# Patient Record
Sex: MALE | Race: WHITE | NOT HISPANIC OR LATINO | Employment: UNEMPLOYED | URBAN - METROPOLITAN AREA
[De-identification: names, ages, dates, MRNs, and addresses within clinical notes are randomized per-mention and may not be internally consistent; named-entity substitution may affect disease eponyms.]

---

## 2019-12-03 ENCOUNTER — APPOINTMENT (OUTPATIENT)
Dept: RADIOLOGY | Facility: MEDICAL CENTER | Age: 24
End: 2019-12-03
Attending: EMERGENCY MEDICINE

## 2019-12-03 ENCOUNTER — HOSPITAL ENCOUNTER (EMERGENCY)
Facility: MEDICAL CENTER | Age: 24
End: 2019-12-03
Attending: EMERGENCY MEDICINE

## 2019-12-03 VITALS
SYSTOLIC BLOOD PRESSURE: 122 MMHG | BODY MASS INDEX: 31.34 KG/M2 | OXYGEN SATURATION: 96 % | HEIGHT: 66 IN | DIASTOLIC BLOOD PRESSURE: 74 MMHG | HEART RATE: 101 BPM | WEIGHT: 195 LBS | RESPIRATION RATE: 18 BRPM

## 2019-12-03 DIAGNOSIS — R56.9 SEIZURE (HCC): ICD-10-CM

## 2019-12-03 LAB
ALBUMIN SERPL BCP-MCNC: 4.7 G/DL (ref 3.2–4.9)
ALBUMIN/GLOB SERPL: 1.7 G/DL
ALP SERPL-CCNC: 71 U/L (ref 30–99)
ALT SERPL-CCNC: 43 U/L (ref 2–50)
ANION GAP SERPL CALC-SCNC: 14 MMOL/L (ref 0–11.9)
AST SERPL-CCNC: 35 U/L (ref 12–45)
BASOPHILS # BLD AUTO: 0.5 % (ref 0–1.8)
BASOPHILS # BLD: 0.03 K/UL (ref 0–0.12)
BILIRUB SERPL-MCNC: 0.5 MG/DL (ref 0.1–1.5)
BUN SERPL-MCNC: 10 MG/DL (ref 8–22)
CALCIUM SERPL-MCNC: 9.7 MG/DL (ref 8.5–10.5)
CHLORIDE SERPL-SCNC: 105 MMOL/L (ref 96–112)
CO2 SERPL-SCNC: 22 MMOL/L (ref 20–33)
CREAT SERPL-MCNC: 0.95 MG/DL (ref 0.5–1.4)
EOSINOPHIL # BLD AUTO: 0.02 K/UL (ref 0–0.51)
EOSINOPHIL NFR BLD: 0.3 % (ref 0–6.9)
ERYTHROCYTE [DISTWIDTH] IN BLOOD BY AUTOMATED COUNT: 43.5 FL (ref 35.9–50)
GLOBULIN SER CALC-MCNC: 2.8 G/DL (ref 1.9–3.5)
GLUCOSE SERPL-MCNC: 98 MG/DL (ref 65–99)
HCT VFR BLD AUTO: 48.4 % (ref 42–52)
HGB BLD-MCNC: 16.5 G/DL (ref 14–18)
IMM GRANULOCYTES # BLD AUTO: 0.02 K/UL (ref 0–0.11)
IMM GRANULOCYTES NFR BLD AUTO: 0.3 % (ref 0–0.9)
LACTATE BLD-SCNC: 3.1 MMOL/L (ref 0.5–2)
LYMPHOCYTES # BLD AUTO: 1.7 K/UL (ref 1–4.8)
LYMPHOCYTES NFR BLD: 26.9 % (ref 22–41)
MCH RBC QN AUTO: 30.7 PG (ref 27–33)
MCHC RBC AUTO-ENTMCNC: 34.1 G/DL (ref 33.7–35.3)
MCV RBC AUTO: 90.1 FL (ref 81.4–97.8)
MONOCYTES # BLD AUTO: 0.59 K/UL (ref 0–0.85)
MONOCYTES NFR BLD AUTO: 9.3 % (ref 0–13.4)
NEUTROPHILS # BLD AUTO: 3.96 K/UL (ref 1.82–7.42)
NEUTROPHILS NFR BLD: 62.7 % (ref 44–72)
NRBC # BLD AUTO: 0 K/UL
NRBC BLD-RTO: 0 /100 WBC
PLATELET # BLD AUTO: 266 K/UL (ref 164–446)
PMV BLD AUTO: 9.9 FL (ref 9–12.9)
POTASSIUM SERPL-SCNC: 4.2 MMOL/L (ref 3.6–5.5)
PROT SERPL-MCNC: 7.5 G/DL (ref 6–8.2)
RBC # BLD AUTO: 5.37 M/UL (ref 4.7–6.1)
SODIUM SERPL-SCNC: 141 MMOL/L (ref 135–145)
WBC # BLD AUTO: 6.3 K/UL (ref 4.8–10.8)

## 2019-12-03 PROCEDURE — 80053 COMPREHEN METABOLIC PANEL: CPT

## 2019-12-03 PROCEDURE — 96376 TX/PRO/DX INJ SAME DRUG ADON: CPT

## 2019-12-03 PROCEDURE — 700105 HCHG RX REV CODE 258: Performed by: EMERGENCY MEDICINE

## 2019-12-03 PROCEDURE — 96375 TX/PRO/DX INJ NEW DRUG ADDON: CPT

## 2019-12-03 PROCEDURE — 96374 THER/PROPH/DIAG INJ IV PUSH: CPT

## 2019-12-03 PROCEDURE — 83605 ASSAY OF LACTIC ACID: CPT

## 2019-12-03 PROCEDURE — 70450 CT HEAD/BRAIN W/O DYE: CPT

## 2019-12-03 PROCEDURE — 700102 HCHG RX REV CODE 250 W/ 637 OVERRIDE(OP): Performed by: EMERGENCY MEDICINE

## 2019-12-03 PROCEDURE — 99285 EMERGENCY DEPT VISIT HI MDM: CPT

## 2019-12-03 PROCEDURE — 85025 COMPLETE CBC W/AUTO DIFF WBC: CPT

## 2019-12-03 PROCEDURE — 700111 HCHG RX REV CODE 636 W/ 250 OVERRIDE (IP): Performed by: EMERGENCY MEDICINE

## 2019-12-03 PROCEDURE — A9270 NON-COVERED ITEM OR SERVICE: HCPCS | Performed by: EMERGENCY MEDICINE

## 2019-12-03 RX ORDER — LAMOTRIGINE 100 MG/1
25 TABLET ORAL DAILY
Status: COMPLETED | OUTPATIENT
Start: 2019-12-03 | End: 2019-12-03

## 2019-12-03 RX ORDER — SODIUM CHLORIDE 9 MG/ML
1000 INJECTION, SOLUTION INTRAVENOUS ONCE
Status: COMPLETED | OUTPATIENT
Start: 2019-12-03 | End: 2019-12-03

## 2019-12-03 RX ORDER — MORPHINE SULFATE 4 MG/ML
4 INJECTION, SOLUTION INTRAMUSCULAR; INTRAVENOUS ONCE
Status: COMPLETED | OUTPATIENT
Start: 2019-12-03 | End: 2019-12-03

## 2019-12-03 RX ORDER — LAMOTRIGINE 25 MG/1
25 TABLET ORAL 2 TIMES DAILY
Qty: 60 TAB | Refills: 0 | Status: SHIPPED | OUTPATIENT
Start: 2019-12-03 | End: 2020-01-02

## 2019-12-03 RX ORDER — ONDANSETRON 2 MG/ML
4 INJECTION INTRAMUSCULAR; INTRAVENOUS ONCE
Status: COMPLETED | OUTPATIENT
Start: 2019-12-03 | End: 2019-12-03

## 2019-12-03 RX ORDER — LAMOTRIGINE 25 MG/1
25 TABLET ORAL DAILY
COMMUNITY
End: 2019-12-03

## 2019-12-03 RX ADMIN — LAMOTRIGINE 25 MG: 100 TABLET ORAL at 14:35

## 2019-12-03 RX ADMIN — SODIUM CHLORIDE 1000 ML: 9 INJECTION, SOLUTION INTRAVENOUS at 13:03

## 2019-12-03 RX ADMIN — ONDANSETRON 4 MG: 2 INJECTION INTRAMUSCULAR; INTRAVENOUS at 14:35

## 2019-12-03 RX ADMIN — ONDANSETRON 4 MG: 2 INJECTION INTRAMUSCULAR; INTRAVENOUS at 13:01

## 2019-12-03 RX ADMIN — MORPHINE SULFATE 4 MG: 4 INJECTION INTRAVENOUS at 13:01

## 2019-12-03 SDOH — HEALTH STABILITY: MENTAL HEALTH: HOW OFTEN DO YOU HAVE A DRINK CONTAINING ALCOHOL?: 2-4 TIMES A MONTH

## 2019-12-03 NOTE — ED NOTES
Patient resting with family at bedside. Case management assessing pt needs. No other concerns at this time.

## 2019-12-03 NOTE — ED PROVIDER NOTES
ED Provider Note    CHIEF COMPLAINT  Chief Complaint   Patient presents with   • Seizure       HPI  Abelino Crocker is a 24 y.o. male with a history of a seizure disorder, asthma who presents after having multiple seizures today.  The patient was diagnosed with seizures several years ago, and last saw a neurologist at [facility] about 2-1/2 years ago.  The patient was placed on Lamictal 25 mg twice a day at that time and has had no further seizures.  The patient ran out of his medications about 3 months ago and is not take any since due to the cost.  Today the patient was at home, said he had an aura, then does not remember much after that.  Family notes that he had a tonic-clonic seizure, and did not appear to be waking up.  He then had a second seizure, and EMS was called.  The patient had 2 further seizures in transport, and was given a total of 5 mg of Versed.  On arrival, the patient is somewhat drowsy, but is awake and able to answer questions appropriately.  He has been sleeping well, denies any recent illness including fever, chills, sore throat, cough, vomiting, or diarrhea.  He does complain of having a headache and some body aches and pains after the seizure today.      REVIEW OF SYSTEMS  See HPI for further details. All other systems are negative.     PAST MEDICAL HISTORY  Past Medical History:   Diagnosis Date   • ASTHMA    • Seizure disorder (HCC)        FAMILY HISTORY  History reviewed. No pertinent family history.    SOCIAL HISTORY  Social History     Socioeconomic History   • Marital status: Single     Spouse name: Not on file   • Number of children: Not on file   • Years of education: Not on file   • Highest education level: Not on file   Occupational History   • Not on file   Social Needs   • Financial resource strain: Not on file   • Food insecurity:     Worry: Not on file     Inability: Not on file   • Transportation needs:     Medical: Not on file     Non-medical: Not on file  "  Tobacco Use   • Smoking status: Never Smoker   • Smokeless tobacco: Never Used   Substance and Sexual Activity   • Alcohol use: Yes     Frequency: 2-4 times a month   • Drug use: Yes     Comment: thc   • Sexual activity: Not on file   Lifestyle   • Physical activity:     Days per week: Not on file     Minutes per session: Not on file   • Stress: Not on file   Relationships   • Social connections:     Talks on phone: Not on file     Gets together: Not on file     Attends Anabaptism service: Not on file     Active member of club or organization: Not on file     Attends meetings of clubs or organizations: Not on file     Relationship status: Not on file   • Intimate partner violence:     Fear of current or ex partner: Not on file     Emotionally abused: Not on file     Physically abused: Not on file     Forced sexual activity: Not on file   Other Topics Concern   • Not on file   Social History Narrative    ** Merged History Encounter **            SURGICAL HISTORY  History reviewed. No pertinent surgical history.    CURRENT MEDICATIONS  Home Medications     Reviewed by Lo Boyd (Pharmacy Tech) on 12/03/19 at 1336  Med List Status: Complete   Patient Get Taking any Medications                 ALLERGIES  No Known Allergies    PHYSICAL EXAM  VITAL SIGNS: Blood Pressure 108/57   Pulse 76   Respiration 18   Height 1.676 m (5' 6\")   Weight 88.5 kg (195 lb)   Oxygen Saturation 96%   Body Mass Index 31.47 kg/m²   Constitutional: Awake, alert, in no acute distress, Non-toxic appearance.   HENT: Atraumatic. Bilateral external ears normal, mucous membranes moist, throat nonerythematous without exudates, no oral trauma, nose is normal.  Eyes: PERRL, EOMI, conjunctiva moist, noninjected.  Neck: Nontender, Normal range of motion, No nuchal rigidity, No stridor.   Lymphatic: No lymphadenopathy noted.   Cardiovascular: Regular rate and rhythm, no murmurs, rubs, gallops.  Thorax & Lungs:  Good breath sounds " bilaterally, no wheezes, rales, or retractions.  No chest tenderness.  Abdomen: Bowel sounds normal, Soft, nontender, nondistended, no rebound, guarding, masses.  Back: No CVA or spinal tenderness.  Extremities: Intact distal pulses, No edema, No tenderness.   Skin: Warm, Dry, No rashes.   Musculoskeletal: No joint swelling or tenderness.  Neurologic: Alert & oriented x 3, sensory and motor function normal. No focal deficits.   Psychiatric: Affect normal, Judgment normal, Mood normal.       RADIOLOGY/PROCEDURES  CT-HEAD W/O   Final Result         1. No acute intracranial abnormality. No evidence of acute intracranial hemorrhage or mass lesion.                   COURSE & MEDICAL DECISION MAKING  Pertinent Labs & Imaging studies reviewed. (See chart for details)  The patient presents after having multiple seizures today.  He has been off his medications for 3 months.  He did receive 5 mg of Versed prior to arrival.  Clinically appears dehydrated with dry mucous membranes.  He has had very little to eat or drink today.  IV was placed, he was given a bolus of normal saline, morphine, and Zofran.    HYDRATION: Based on the patient's presentation of Dehydration and Inability to take oral fluids the patient was given IV fluids. IV Hydration was used because oral hydration was not adequate alone. Upon recheck following hydration, the patient was feeling improved.     CBC is normal with white count 6300, normal differential, chemistry shows a CO2 22, anion gap 14, otherwise unremarkable.  Lactic acid elevated 3.1.  CT scan of head without contrast was negative for any acute intracranial findings.  On recheck, the patient was feeling improved.  He was given Lamictal 25 mg orally.  I had  see the patient to see if they could assist with his medications.  They did give him a discount card and said that the patient felt he could afford the medications.  The patient will be placed on his Lamictal 25 mg twice a day.   He is referred to Dr. Irwin of neurology for follow-up.  I also asked him to follow-up with his PCP.  He is told not to drive or operate any vehicles.  He is to keep his feet on the ground, avoid working at heights.  He is to return to the ER for any recurrent seizures, fever, vomiting, or any other problems.          FINAL IMPRESSION  1.  Seizure disorder  2.   3.         Electronically signed by: Raheel Sanchez, 12/3/2019 3:32 PM

## 2019-12-03 NOTE — DISCHARGE PLANNING
Asked to assist with obtaining medications for the patient.  Patient currently has no insurance and will not get any at his job for another two months or so.  Look at [employer] for coupon but found that medication is only $9.00 at [facility] without insurance or the coupon.  Called [facility] to ensure price and was told that $9.00 cost was fact.  Did give [employer] paperwork to family so they could show [facility] if issues arise.  Family appreciative.    Advised patient to get an appointment with his PCP as he needed to be followed closely and that the ER is really not appropriate for medication refills.  Patient stated understanding and will find out who his insurance has for PCP's and make an appointment for shortly after when his insurance kicks in.    Also suggested that patient call or contact [employer] for temporary insurance assistance.  His mother, who is at bedside, is going immediately up to see [employer].

## 2019-12-03 NOTE — ED NOTES
Med rec complete per pt- denies taking medications. Stopped taking lamictal approx 3 months ago. NKDA.

## 2019-12-03 NOTE — ED TRIAGE NOTES
Patient BIB EMS from home for seizure. X4. Witnessed by EMS. Did not return to consciousness between seizure 3 and 4. 5mg versed Im given. Pt currently A&Ox3. Delayed responses during conversation, but appropriate.     Stopped taking lamictal d/t price.     Also has injury to R knuckles but states it was from last night when he punched a picture frame.

## 2021-05-12 ENCOUNTER — OFFICE VISIT (OUTPATIENT)
Dept: MEDICAL GROUP | Facility: MEDICAL CENTER | Age: 26
End: 2021-05-12
Attending: INTERNAL MEDICINE
Payer: MEDICAID

## 2021-05-12 VITALS
WEIGHT: 186 LBS | OXYGEN SATURATION: 97 % | HEIGHT: 67 IN | DIASTOLIC BLOOD PRESSURE: 80 MMHG | TEMPERATURE: 98.1 F | SYSTOLIC BLOOD PRESSURE: 122 MMHG | BODY MASS INDEX: 29.19 KG/M2 | RESPIRATION RATE: 16 BRPM | HEART RATE: 82 BPM

## 2021-05-12 DIAGNOSIS — G40.909 SEIZURE DISORDER (HCC): ICD-10-CM

## 2021-05-12 DIAGNOSIS — F41.1 GENERALIZED ANXIETY DISORDER WITH PANIC ATTACKS: ICD-10-CM

## 2021-05-12 DIAGNOSIS — J45.20 MILD INTERMITTENT ASTHMA WITHOUT COMPLICATION: ICD-10-CM

## 2021-05-12 DIAGNOSIS — F41.0 GENERALIZED ANXIETY DISORDER WITH PANIC ATTACKS: ICD-10-CM

## 2021-05-12 DIAGNOSIS — K92.0 HEMATEMESIS WITH NAUSEA: ICD-10-CM

## 2021-05-12 PROBLEM — F12.90 MARIJUANA USE: Status: ACTIVE | Noted: 2021-05-12

## 2021-05-12 PROBLEM — J45.40 MODERATE PERSISTENT ASTHMA WITHOUT COMPLICATION: Status: ACTIVE | Noted: 2021-05-12

## 2021-05-12 PROCEDURE — 99204 OFFICE O/P NEW MOD 45 MIN: CPT | Performed by: INTERNAL MEDICINE

## 2021-05-12 PROCEDURE — 99213 OFFICE O/P EST LOW 20 MIN: CPT | Performed by: INTERNAL MEDICINE

## 2021-05-12 RX ORDER — SUCRALFATE 1 G/1
1 TABLET ORAL
Qty: 90 TABLET | Refills: 3 | Status: SHIPPED | OUTPATIENT
Start: 2021-05-12 | End: 2021-08-30

## 2021-05-12 RX ORDER — LAMOTRIGINE 25 MG/1
25 TABLET ORAL DAILY
Qty: 30 TABLET | Refills: 3 | Status: SHIPPED | OUTPATIENT
Start: 2021-05-12 | End: 2021-08-04 | Stop reason: SDUPTHER

## 2021-05-12 RX ORDER — ALBUTEROL SULFATE 90 UG/1
2 AEROSOL, METERED RESPIRATORY (INHALATION) EVERY 6 HOURS PRN
Qty: 8.5 G | Refills: 5 | Status: SHIPPED | OUTPATIENT
Start: 2021-05-12 | End: 2021-05-14 | Stop reason: SDUPTHER

## 2021-05-12 RX ORDER — OMEPRAZOLE 20 MG/1
20 CAPSULE, DELAYED RELEASE ORAL 2 TIMES DAILY
Qty: 60 CAPSULE | Refills: 3 | Status: SHIPPED | OUTPATIENT
Start: 2021-05-12

## 2021-05-12 RX ORDER — HYDROXYZINE HYDROCHLORIDE 25 MG/1
25 TABLET, FILM COATED ORAL 3 TIMES DAILY PRN
Qty: 30 TABLET | Refills: 2 | Status: SHIPPED | OUTPATIENT
Start: 2021-05-12

## 2021-05-12 RX ORDER — ESCITALOPRAM OXALATE 10 MG/1
TABLET ORAL
Qty: 30 TABLET | Refills: 1 | Status: SHIPPED | OUTPATIENT
Start: 2021-05-12 | End: 2021-07-13 | Stop reason: SDUPTHER

## 2021-05-12 ASSESSMENT — FIBROSIS 4 INDEX: FIB4 SCORE: 0.46

## 2021-05-12 ASSESSMENT — PATIENT HEALTH QUESTIONNAIRE - PHQ9: CLINICAL INTERPRETATION OF PHQ2 SCORE: 0

## 2021-05-12 NOTE — ASSESSMENT & PLAN NOTE
Patient reports a long history of seizures.  States he was first diagnosed at age 17 and he did have a neurologist but has not been seen by one for about 4 years due to not having insurance.  States that when he was initially diagnosed, seizures were quite frequent and he was having 1 every month.  His most recent brain imaging was a CT head obtained December 2019 and was normal.  States that they were never able to figure out the cause of his seizures but they have affected him severely.  He has been off of medication for several years because he has not had a PCP or neurologist.  Last seizure was about a month ago.  He does not drive.  He has noticed significant memory difficulties which he relates to his frequent seizures.  In the past, he has been on numerous medications.  He states the Lamictal worked the best but he still had seizures while taking it.  Recalls being on several other medications and on chart review it looks like he was taking Dilantin, Depakote, and Keppra.  He says he had significant side effects with the combination of these 3 meds and Lamictal with a lot of drowsiness and difficulty functioning.

## 2021-05-12 NOTE — PROGRESS NOTES
Abelino Crocker is a 25 y.o. male here for anxiety, seizures, stomach pain, est care  HPI:  No previous PCP  Seizure disorder (HCC)  Patient reports a long history of seizures.  States he was first diagnosed at age 17 and he did have a neurologist but has not been seen by one for about 4 years due to not having insurance.  States that when he was initially diagnosed, seizures were quite frequent and he was having 1 every month.  His most recent brain imaging was a CT head obtained December 2019 and was normal.  States that they were never able to figure out the cause of his seizures but they have affected him severely.  He has been off of medication for several years because he has not had a PCP or neurologist.  Last seizure was about a month ago.  He does not drive.  He has noticed significant memory difficulties which he relates to his frequent seizures.  In the past, he has been on numerous medications.  He states the Lamictal worked the best but he still had seizures while taking it.  Recalls being on several other medications and on chart review it looks like he was taking Dilantin, Depakote, and Keppra.  He says he had significant side effects with the combination of these 3 meds and Lamictal with a lot of drowsiness and difficulty functioning.      Hematemesis with nausea  RegularAbout a month ago, started to have nausea with vomiting.  Reports always having some nausea which he has related to his anxiety.  He is having pain in the upper abdomen and a burning feeling along the sides of the abdomen.  He states that about 3 weeks ago he had a large episode of hematemesis and he has had small amounts of blood in his vomit ever since.  He started taking Prilosec intermittently and thinks this has helped a little bit.  He is also changed his diet by stopping spicy foods.  He reports some episodes of melena.  Denies out of country travel.  Has not sought medical attention even after the large episode of  hematemesis.  No history of alcoholism or cirrhosis although he does sometimes have 5 or 6 beers at a time.  No excessive NSAID use.    Mild intermittent asthma without complication  Has a history of asthma since childhood.  Has been using his mother's albuterol inhaler 1-2 times a day.  Reports it has been more frequent lately since he gets some chest tightness after his nausea and vomiting but he typically does not need to use it daily.  He denies nocturnal symptoms, cough.      Generalized anxiety disorder with panic attacks  He reports a long history of anxiety.  States that he has never been treated for it before outside 8 weeks of therapy that he did through the court system.  States that this did not really help.  Is having panic attacks at least 1 time per week which are very severe and is feeling anxious every day with milder attacks associated with nausea.  Has never been on any medication for anxiety or depression and does not know of any family members who have been treated.     Current medicines (including changes today)  Current Outpatient Medications   Medication Sig Dispense Refill   • Multiple Vitamin (MULTIVITAMIN PO) Take  by mouth.     • lamoTRIgine (LAMICTAL) 25 MG Tab Take 1 tablet by mouth every day. 30 tablet 3   • omeprazole (PRILOSEC) 20 MG delayed-release capsule Take 1 capsule by mouth 2 times a day. 60 capsule 3   • sucralfate (CARAFATE) 1 GM Tab Take 1 tablet by mouth 4 Times a Day,Before Meals and at Bedtime. 90 tablet 3   • albuterol 108 (90 Base) MCG/ACT Aero Soln inhalation aerosol Inhale 2 Puffs every 6 hours as needed for Shortness of Breath. 8.5 g 5   • hydrOXYzine HCl (ATARAX) 25 MG Tab Take 1 tablet by mouth 3 times a day as needed for Anxiety. 30 tablet 2   • escitalopram (LEXAPRO) 10 MG Tab Take 1/2 tab for 1 week then increase to 1 full tab 30 tablet 1     No current facility-administered medications for this visit.     He  has a past medical history of ASTHMA and Seizure  disorder (HCC).  He  has no past surgical history on file.  Social History     Tobacco Use   • Smoking status: Never Smoker   • Smokeless tobacco: Never Used   Vaping Use   • Vaping Use: Never used   Substance Use Topics   • Alcohol use: Yes     Comment: 5-6 beers twice a month   • Drug use: Yes     Frequency: 7.0 times per week     Types: Marijuana, Inhaled     Comment: tinctures, edibles     Social History     Social History Narrative    ** Merged History Encounter **          Family History   Problem Relation Age of Onset   • Asthma Mother    • Diabetes Maternal Grandfather    • Hypertension Maternal Grandfather    • Hyperlipidemia Maternal Grandfather    • Cancer Paternal Grandmother    • Heart Disease Neg Hx    • Stroke Neg Hx          ROS  As above in HPI  All other systems reviewed and are negative     Objective:     Vitals:    05/12/21 1344   BP: 122/80   Pulse: 82   Resp: 16   Temp: 36.7 °C (98.1 °F)   SpO2: 97%     Body mass index is 29.57 kg/m².  Physical Exam:    Constitutional: Alert, no distress.  Skin: Warm, dry, good turgor, no rashes in visible areas.  Eye: Equal, round and reactive, conjunctiva clear, lids normal.  ENMT: Lips without lesions, good dentition, oropharynx clear, TM's clear bilaterally.  Neck: Trachea midline, no masses, no thyromegaly. No cervical or supraclavicular lymphadenopathy.  Respiratory: Unlabored respiratory effort, lungs clear to auscultation, no wheezes, no ronchi.  Cardiovascular: Regular rate and rhythm, no murmurs appreciated, no lower extremity edema.  Abdomen: Soft, moderate tenderness to palpation in epigastric region without rebound or guarding, mild tenderness over RUQ and LUQ, non tender over LLQ and RLQ, no masses, no hepatosplenomegaly.  Psych: Alert and oriented x3, tearful throughout interview        Assessment and Plan:   The following treatment plan was discussed    1. Seizure disorder (HCC)  Uncontrolled.  We will restart low-dose of Lamictal but this  likely needs to be uptitrated and he would benefit from being under the care of neurology for management.  Letter written stating that he is unable to drive at this time for his work.  - REFERRAL TO NEUROLOGY  - lamoTRIgine (LAMICTAL) 25 MG Tab; Take 1 tablet by mouth every day.  Dispense: 30 tablet; Refill: 3  - Comp Metabolic Panel; Future    2. Hematemesis with nausea  Concern for peptic ulcer disease versus gastritis.  Given his significant hematemesis recently with persistent more mild hematemesis and melena, have placed urgent referral to GI as I do believe he would benefit from an upper endoscopy to evaluate bleeding source.  We will have him start twice daily Prilosec and Carafate before meals and obtain labs to make sure he is not anemic or iron deficient.  ER precautions given for increased bleeding and pain  - REFERRAL TO GASTROENTEROLOGY  - omeprazole (PRILOSEC) 20 MG delayed-release capsule; Take 1 capsule by mouth 2 times a day.  Dispense: 60 capsule; Refill: 3  - sucralfate (CARAFATE) 1 GM Tab; Take 1 tablet by mouth 4 Times a Day,Before Meals and at Bedtime.  Dispense: 90 tablet; Refill: 3  - Comp Metabolic Panel; Future  - CBC WITH DIFFERENTIAL; Future  - FERRITIN; Future  - IRON/TOTAL IRON BIND; Future    3. Mild intermittent asthma without complication  - albuterol 108 (90 Base) MCG/ACT Aero Soln inhalation aerosol; Inhale 2 Puffs every 6 hours as needed for Shortness of Breath.  Dispense: 8.5 g; Refill: 5    4. Generalized anxiety disorder with panic attacks  Uncontrolled.  We will start on Lexapro and hydroxyzine as needed.  Declines referral to therapy.  We will follow-up in 5 weeks.  - hydrOXYzine HCl (ATARAX) 25 MG Tab; Take 1 tablet by mouth 3 times a day as needed for Anxiety.  Dispense: 30 tablet; Refill: 2  - escitalopram (LEXAPRO) 10 MG Tab; Take 1/2 tab for 1 week then increase to 1 full tab  Dispense: 30 tablet; Refill: 1        Followup: Return in about 5 weeks (around 6/16/2021), or  if symptoms worsen or fail to improve, for anxiety.

## 2021-05-12 NOTE — ASSESSMENT & PLAN NOTE
He reports a long history of anxiety.  States that he has never been treated for it before outside 8 weeks of therapy that he did through the court system.  States that this did not really help.  Is having panic attacks at least 1 time per week which are very severe and is feeling anxious every day with milder attacks associated with nausea.  Has never been on any medication for anxiety or depression and does not know of any family members who have been treated.

## 2021-05-12 NOTE — ASSESSMENT & PLAN NOTE
Has a history of asthma since childhood.  Has been using his mother's albuterol inhaler 1-2 times a day.  Reports it has been more frequent lately since he gets some chest tightness after his nausea and vomiting but he typically does not need to use it daily.  He denies nocturnal symptoms, cough.

## 2021-05-12 NOTE — LETTER
May 12, 2021      To whom it may concern:    Abelino Crocker is currently a patient under my care at the [facility].  He suffers from seizures which are not well controlled currently.  Because of this, he cannot safely drive.  He has been referred to neurology and restarted on medication.  Neurology will clear him when he is safe to drive.    If you have any questions or concerns, please don't hesitate to call.        Sincerely,        Mariah Baxter M.D.    Electronically Signed

## 2021-05-14 RX ORDER — ALBUTEROL SULFATE 90 UG/1
2 AEROSOL, METERED RESPIRATORY (INHALATION) EVERY 6 HOURS PRN
Qty: 8.5 G | Refills: 5 | Status: SHIPPED | OUTPATIENT
Start: 2021-05-14

## 2021-06-29 ENCOUNTER — TELEPHONE (OUTPATIENT)
Dept: MEDICAL GROUP | Facility: MEDICAL CENTER | Age: 26
End: 2021-06-29

## 2021-07-13 DIAGNOSIS — F41.0 GENERALIZED ANXIETY DISORDER WITH PANIC ATTACKS: ICD-10-CM

## 2021-07-13 DIAGNOSIS — F41.1 GENERALIZED ANXIETY DISORDER WITH PANIC ATTACKS: ICD-10-CM

## 2021-07-14 RX ORDER — ESCITALOPRAM OXALATE 10 MG/1
10 TABLET ORAL DAILY
Qty: 30 TABLET | Refills: 5 | Status: SHIPPED | OUTPATIENT
Start: 2021-07-14

## 2021-08-04 ENCOUNTER — TELEPHONE (OUTPATIENT)
Dept: MEDICAL GROUP | Facility: MEDICAL CENTER | Age: 26
End: 2021-08-04

## 2021-08-04 DIAGNOSIS — G40.909 SEIZURE DISORDER (HCC): ICD-10-CM

## 2021-08-04 RX ORDER — LAMOTRIGINE 25 MG/1
25 TABLET ORAL 2 TIMES DAILY
Qty: 60 TABLET | Refills: 3 | Status: SHIPPED | OUTPATIENT
Start: 2021-08-04

## 2021-08-05 NOTE — TELEPHONE ENCOUNTER
Received request via: Pharmacy    Was the patient seen in the last year in this department? Yes    Does the patient have an active prescription (recently filled or refills available) for medication(s) requested? No         Spoke with Patients mother, patient was reported to have had 3 seizures in one day, was taken to Er, Er provider advised to have Pt take lamotrigine @25mg bid. Patient is out of medication and pharmacy is indicating that the current Rx is showing too early to fill. Needs new rx   Checked with insurance, shows covered     Rx to show change in dosage

## 2021-08-09 ENCOUNTER — TELEPHONE (OUTPATIENT)
Dept: MEDICAL GROUP | Facility: MEDICAL CENTER | Age: 26
End: 2021-08-09

## 2021-08-09 NOTE — TELEPHONE ENCOUNTER
Phone Number Called: [phone] (home)     Call outcome: Spoke to pharmacy re: pt.'s rx    Message: Pharmacy faxed a prescription refill request for a discontinued rx for escitalopram. Called and updated pharmacy with new rx.

## 2021-08-24 DIAGNOSIS — K92.0 HEMATEMESIS WITH NAUSEA: ICD-10-CM

## 2021-08-24 NOTE — TELEPHONE ENCOUNTER
Received request via: Pharmacy    Was the patient seen in the last year in this department? Yes    Does the patient have an active prescription (recently filled or refills available) for medication(s) requested? No   No apt scheduled at this time

## 2021-08-30 RX ORDER — SUCRALFATE 1 G/1
TABLET ORAL
Qty: 120 TABLET | Refills: 3 | Status: SHIPPED | OUTPATIENT
Start: 2021-08-30

## 2022-12-28 DIAGNOSIS — F41.0 GENERALIZED ANXIETY DISORDER WITH PANIC ATTACKS: ICD-10-CM

## 2022-12-28 DIAGNOSIS — G40.909 SEIZURE DISORDER (HCC): ICD-10-CM

## 2022-12-28 DIAGNOSIS — F41.1 GENERALIZED ANXIETY DISORDER WITH PANIC ATTACKS: ICD-10-CM

## 2022-12-28 RX ORDER — HYDROXYZINE HYDROCHLORIDE 25 MG/1
25 TABLET, FILM COATED ORAL 3 TIMES DAILY PRN
Qty: 30 TABLET | Refills: 2 | OUTPATIENT
Start: 2022-12-28

## 2022-12-28 RX ORDER — LAMOTRIGINE 25 MG/1
25 TABLET ORAL 2 TIMES DAILY
Qty: 60 TABLET | Refills: 3 | OUTPATIENT
Start: 2022-12-28

## 2022-12-28 NOTE — TELEPHONE ENCOUNTER
Received request via: Pharmacy    Was the patient seen in the last year in this department? Yes    Does the patient have an active prescription (recently filled or refills available) for medication(s) requested? No    Does the patient have retirement Plus and need 100 day supply (blood pressure, diabetes and cholesterol meds only)? Patient does not have SCP

## 2023-05-17 NOTE — ASSESSMENT & PLAN NOTE
RegularAbout a month ago, started to have nausea with vomiting.  Reports always having some nausea which he has related to his anxiety.  He is having pain in the upper abdomen and a burning feeling along the sides of the abdomen.  He states that about 3 weeks ago he had a large episode of hematemesis and he has had small amounts of blood in his vomit ever since.  He started taking Prilosec intermittently and thinks this has helped a little bit.  He is also changed his diet by stopping spicy foods.  He reports some episodes of melena.  Denies out of country travel.  Has not sought medical attention even after the large episode of hematemesis.  No history of alcoholism or cirrhosis although he does sometimes have 5 or 6 beers at a time.  No excessive NSAID use.   Xenograft Text: The defect edges were debeveled with a #15 scalpel blade.  Given the location of the defect, shape of the defect and the proximity to free margins a xenograft was deemed most appropriate.  The graft was then trimmed to fit the size of the defect.  The graft was then placed in the primary defect and oriented appropriately.

## 2024-07-31 ENCOUNTER — OFFICE VISIT (OUTPATIENT)
Dept: MEDICAL GROUP | Facility: MEDICAL CENTER | Age: 29
End: 2024-07-31
Attending: FAMILY MEDICINE
Payer: COMMERCIAL

## 2024-07-31 VITALS
OXYGEN SATURATION: 98 % | BODY MASS INDEX: 30.68 KG/M2 | DIASTOLIC BLOOD PRESSURE: 76 MMHG | SYSTOLIC BLOOD PRESSURE: 118 MMHG | TEMPERATURE: 97.2 F | WEIGHT: 193 LBS | RESPIRATION RATE: 16 BRPM | HEART RATE: 88 BPM

## 2024-07-31 DIAGNOSIS — M25.561 ACUTE PAIN OF BOTH KNEES: ICD-10-CM

## 2024-07-31 DIAGNOSIS — G40.909 SEIZURE DISORDER (HCC): ICD-10-CM

## 2024-07-31 DIAGNOSIS — F41.1 GENERALIZED ANXIETY DISORDER WITH PANIC ATTACKS: ICD-10-CM

## 2024-07-31 DIAGNOSIS — F43.21 GRIEF: ICD-10-CM

## 2024-07-31 DIAGNOSIS — Z13.21 SCREENING FOR ENDOCRINE, NUTRITIONAL, METABOLIC AND IMMUNITY DISORDER: ICD-10-CM

## 2024-07-31 DIAGNOSIS — F41.0 GENERALIZED ANXIETY DISORDER WITH PANIC ATTACKS: ICD-10-CM

## 2024-07-31 DIAGNOSIS — Z11.59 NEED FOR HEPATITIS C SCREENING TEST: ICD-10-CM

## 2024-07-31 DIAGNOSIS — Z13.0 SCREENING FOR ENDOCRINE, NUTRITIONAL, METABOLIC AND IMMUNITY DISORDER: ICD-10-CM

## 2024-07-31 DIAGNOSIS — R29.898 LEG WEAKNESS, BILATERAL: ICD-10-CM

## 2024-07-31 DIAGNOSIS — Z13.228 SCREENING FOR ENDOCRINE, NUTRITIONAL, METABOLIC AND IMMUNITY DISORDER: ICD-10-CM

## 2024-07-31 DIAGNOSIS — M25.562 ACUTE PAIN OF BOTH KNEES: ICD-10-CM

## 2024-07-31 DIAGNOSIS — Z13.29 SCREENING FOR ENDOCRINE, NUTRITIONAL, METABOLIC AND IMMUNITY DISORDER: ICD-10-CM

## 2024-07-31 DIAGNOSIS — Z11.4 SCREENING FOR HIV WITHOUT PRESENCE OF RISK FACTORS: ICD-10-CM

## 2024-07-31 RX ORDER — CYCLOBENZAPRINE HCL 10 MG
10 TABLET ORAL 3 TIMES DAILY PRN
Qty: 30 TABLET | Refills: 0 | Status: SHIPPED | OUTPATIENT
Start: 2024-07-31

## 2024-07-31 RX ORDER — HYDROXYZINE HYDROCHLORIDE 25 MG/1
25 TABLET, FILM COATED ORAL 3 TIMES DAILY PRN
Qty: 30 TABLET | Refills: 2 | Status: SHIPPED | OUTPATIENT
Start: 2024-07-31

## 2024-07-31 RX ORDER — MELOXICAM 7.5 MG/1
7.5 TABLET ORAL
Qty: 60 TABLET | Refills: 2 | Status: SHIPPED | OUTPATIENT
Start: 2024-07-31 | End: 2024-08-30

## 2024-07-31 RX ORDER — LAMOTRIGINE 100 MG/1
100 TABLET ORAL DAILY
Qty: 30 TABLET | Refills: 0 | Status: SHIPPED | OUTPATIENT
Start: 2024-07-31

## 2024-07-31 ASSESSMENT — PATIENT HEALTH QUESTIONNAIRE - PHQ9: CLINICAL INTERPRETATION OF PHQ2 SCORE: 0

## 2024-08-13 DIAGNOSIS — M25.561 ACUTE PAIN OF BOTH KNEES: ICD-10-CM

## 2024-08-13 DIAGNOSIS — M25.562 ACUTE PAIN OF BOTH KNEES: ICD-10-CM

## 2024-08-14 RX ORDER — CYCLOBENZAPRINE HCL 10 MG
TABLET ORAL
Qty: 30 TABLET | Refills: 1 | Status: SHIPPED | OUTPATIENT
Start: 2024-08-14

## 2024-08-14 NOTE — TELEPHONE ENCOUNTER
Received request via: Pharmacy    Was the patient seen in the last year in this department? Yes    Does the patient have an active prescription (recently filled or refills available) for medication(s) requested? No    [facility]    Does the patient have long-term Plus and need 100-day supply? (This applies to ALL medications) Patient does not have SCP

## 2024-09-24 DIAGNOSIS — M25.561 ACUTE PAIN OF BOTH KNEES: ICD-10-CM

## 2024-09-24 DIAGNOSIS — F41.0 GENERALIZED ANXIETY DISORDER WITH PANIC ATTACKS: ICD-10-CM

## 2024-09-24 DIAGNOSIS — F41.1 GENERALIZED ANXIETY DISORDER WITH PANIC ATTACKS: ICD-10-CM

## 2024-09-24 DIAGNOSIS — M25.562 ACUTE PAIN OF BOTH KNEES: ICD-10-CM

## 2024-09-24 RX ORDER — HYDROXYZINE HYDROCHLORIDE 25 MG/1
TABLET, FILM COATED ORAL
Qty: 30 TABLET | Refills: 0 | Status: SHIPPED | OUTPATIENT
Start: 2024-09-24

## 2024-09-24 RX ORDER — CYCLOBENZAPRINE HCL 10 MG
TABLET ORAL
Qty: 30 TABLET | Refills: 0 | Status: SHIPPED | OUTPATIENT
Start: 2024-09-24

## 2024-09-24 NOTE — TELEPHONE ENCOUNTER
Received request via: Pharmacy    Was the patient seen in the last year in this department? Yes    Does the patient have an active prescription (recently filled or refills available) for medication(s) requested? No    Pharmacy Name: [facility]    Does the patient have long-term Plus and need 100-day supply? (This applies to ALL medications) Patient does not have SCP    Future Appointments         Provider Department Center    10/3/2024 12:00 PM Jhony Mullins M.D. [facility]

## 2025-04-09 ENCOUNTER — HOSPITAL ENCOUNTER (EMERGENCY)
Facility: MEDICAL CENTER | Age: 30
End: 2025-04-09
Attending: EMERGENCY MEDICINE
Payer: COMMERCIAL

## 2025-04-09 VITALS
WEIGHT: 195.11 LBS | OXYGEN SATURATION: 97 % | HEIGHT: 66 IN | SYSTOLIC BLOOD PRESSURE: 111 MMHG | HEART RATE: 115 BPM | RESPIRATION RATE: 16 BRPM | DIASTOLIC BLOOD PRESSURE: 85 MMHG | TEMPERATURE: 99.3 F | BODY MASS INDEX: 31.36 KG/M2

## 2025-04-09 DIAGNOSIS — R33.9 URINARY RETENTION: ICD-10-CM

## 2025-04-09 DIAGNOSIS — Z53.29 LEFT AGAINST MEDICAL ADVICE: ICD-10-CM

## 2025-04-09 DIAGNOSIS — M54.50 CHRONIC MIDLINE LOW BACK PAIN, UNSPECIFIED WHETHER SCIATICA PRESENT: ICD-10-CM

## 2025-04-09 DIAGNOSIS — G89.29 CHRONIC MIDLINE LOW BACK PAIN, UNSPECIFIED WHETHER SCIATICA PRESENT: ICD-10-CM

## 2025-04-09 PROCEDURE — 99281 EMR DPT VST MAYX REQ PHY/QHP: CPT

## 2025-04-09 NOTE — ED PROVIDER NOTES
CHIEF COMPLAINT  Chief Complaint   Patient presents with    Other     Fell last yr and a half s/p sz fell down stairs   was seen for this  Hx of bulging disc to lower back    since then has had numbness to both legs  and occ to both hands   was seen at pain specialist and told to come to hospital due to now having issues to bladder   having difficulty urinating and having BM   started about a month ago    per pt specialist is concerned about advancing spine issues        LIMITATION TO HISTORY   Select: none    HPI    Abelino Crocker is a 29 y.o. male who presents to the Emergency Department as a referral from his pain specialist at [facility] pain and spine for possible cauda equina syndrome.  The patient has a history of epilepsy as well as chronic back pain.  The patient has been seen by pain and spine in the past.  Patient states that over the past year he has been having pain to his back he is being treated by the pain specialist as well.  He has noticed over the past couple weeks however he is just been unable to urinate and having bladder symptoms he went to see his pain specialist today who was concerned about the possibility of contr Aquinas syndrome and sent the patient to the Emergency Department for evaluation.  Upon arrival here patient still describes continued pain and weakness to his lower extremities but now is having some bladder issues where he has a hard time urinating.  He also describes a hard time defecating.  Denies any fevers chills or any other symptoms and is here for evaluation.    OUTSIDE HISTORIAN(S):  Select: None    EXTERNAL RECORDS REVIEWED  Select: Other MRIs done 8/24/2024 showed degenerative changes of the thoracic and lumbar spine.      PAST MEDICAL HISTORY  Past Medical History:   Diagnosis Date    ASTHMA     Seizure disorder (HCC)      .    SURGICAL HISTORY  History reviewed. No pertinent surgical history.      FAMILY HISTORY  Family History   Problem Relation Age of Onset     Asthma Mother     Diabetes Maternal Grandfather     Hypertension Maternal Grandfather     Hyperlipidemia Maternal Grandfather     Cancer Paternal Grandmother     No Known Problems Son     Heart Disease Neg Hx     Stroke Neg Hx           SOCIAL HISTORY  Social History     Socioeconomic History    Marital status: Single     Spouse name: Not on file    Number of children: Not on file    Years of education: Not on file    Highest education level: Not on file   Occupational History     Comment:    Tobacco Use    Smoking status: Never    Smokeless tobacco: Never   Vaping Use    Vaping status: Never Used   Substance and Sexual Activity    Alcohol use: Not Currently    Drug use: Yes     Frequency: 7.0 times per week     Types: Marijuana, Inhaled     Comment: tinctures, edibles    Sexual activity: Not Currently     Partners: Female   Other Topics Concern    Not on file   Social History Narrative    Lives with son born 2017, 1 dog and 1 cat     Social Drivers of Health     Financial Resource Strain: Not on file   Food Insecurity: Not on file   Transportation Needs: Not on file   Physical Activity: Not on file   Stress: Not on file   Social Connections: Not on file   Intimate Partner Violence: Not on file   Housing Stability: Not on file         CURRENT MEDICATIONS  No current facility-administered medications on file prior to encounter.     Current Outpatient Medications on File Prior to Encounter   Medication Sig Dispense Refill    cyclobenzaprine (FLEXERIL) 10 mg Tab TAKE 1 TABLET BY MOUTH THREE TIMES DAILY AS NEEDED FOR MUSCLE SPASM OR MODERATE PAIN 30 Tablet 0    hydrOXYzine HCl (ATARAX) 25 MG Tab TAKE 1 TABLET BY MOUTH THREE TIMES DAILY AS NEEDED FOR ANXIETY AND FOR SLEEP 30 Tablet 0    lamoTRIgine (LAMICTAL) 100 MG Tab Take 1 Tablet by mouth every day. 30 Tablet 2    lamoTRIgine (LAMICTAL) 100 MG Tab Take 1 Tablet by mouth every day. 30 Tablet 0    Multiple Vitamin (MULTIVITAMIN PO) Take  by mouth.    "          ALLERGIES  No Known Allergies    PHYSICAL EXAM  VITAL SIGNS:/85   Pulse (!) 115   Temp 37.4 °C (99.3 °F) (Temporal)   Resp 16   Ht 1.676 m (5' 6\")   Wt 88.5 kg (195 lb 1.7 oz)   SpO2 97%   BMI 31.49 kg/m²     Constitutional:  Well-developed no acute distress   HENT: Normocephalic, Atraumatic, Bilateral external ears normal.  Eyes:  conjunctiva are normal.   Neck: Supple.  Nontender midline  Cardiovascular: Regular rate and rhythm without murmurs gallops or rubs.   Thorax & Lungs: No respiratory distress. Breathing comfortably. Lungs are clear to auscultation bilaterally, there are no wheezes no rales. Chest wall is nontender.  Abdomen: Soft, non distended, non tender   Skin: Warm, Dry, No erythema,   Back: No tenderness, No CVA tenderness.  Musculoskeletal: No clubbing cyanosis or edema good range of motion is 4/5 strength in both lower extremities.  Neurologic: Alert & oriented x 3, normal sensation moving all extremities appears normal DTRs are 3+ and equal in both lower extremities.  The patient is mildly weak but equal in both lower extremities.  Sensation is intact distally.  Psychiatric: Affect normal, Judgment normal, Mood normal.       DIAGNOSTIC STUDIES / PROCEDURES      LABS  Results for orders placed or performed during the hospital encounter of 12/03/19   CBC WITH DIFFERENTIAL    Collection Time: 12/03/19 12:19 PM   Result Value Ref Range    WBC 6.3 4.8 - 10.8 K/uL    RBC 5.37 4.70 - 6.10 M/uL    Hemoglobin 16.5 14.0 - 18.0 g/dL    Hematocrit 48.4 42.0 - 52.0 %    MCV 90.1 81.4 - 97.8 fL    MCH 30.7 27.0 - 33.0 pg    MCHC 34.1 33.7 - 35.3 g/dL    RDW 43.5 35.9 - 50.0 fL    Platelet Count 266 164 - 446 K/uL    MPV 9.9 9.0 - 12.9 fL    Neutrophils-Polys 62.70 44.00 - 72.00 %    Lymphocytes 26.90 22.00 - 41.00 %    Monocytes 9.30 0.00 - 13.40 %    Eosinophils 0.30 0.00 - 6.90 %    Basophils 0.50 0.00 - 1.80 %    Immature Granulocytes 0.30 0.00 - 0.90 %    Nucleated RBC 0.00 /100 WBC "    Neutrophils (Absolute) 3.96 1.82 - 7.42 K/uL    Lymphs (Absolute) 1.70 1.00 - 4.80 K/uL    Monos (Absolute) 0.59 0.00 - 0.85 K/uL    Eos (Absolute) 0.02 0.00 - 0.51 K/uL    Baso (Absolute) 0.03 0.00 - 0.12 K/uL    Immature Granulocytes (abs) 0.02 0.00 - 0.11 K/uL    NRBC (Absolute) 0.00 K/uL   COMP METABOLIC PANEL    Collection Time: 12/03/19 12:19 PM   Result Value Ref Range    Sodium 141 135 - 145 mmol/L    Potassium 4.2 3.6 - 5.5 mmol/L    Chloride 105 96 - 112 mmol/L    Co2 22 20 - 33 mmol/L    Anion Gap 14.0 (H) 0.0 - 11.9    Glucose 98 65 - 99 mg/dL    Bun 10 8 - 22 mg/dL    Creatinine 0.95 0.50 - 1.40 mg/dL    Calcium 9.7 8.5 - 10.5 mg/dL    AST(SGOT) 35 12 - 45 U/L    ALT(SGPT) 43 2 - 50 U/L    Alkaline Phosphatase 71 30 - 99 U/L    Total Bilirubin 0.5 0.1 - 1.5 mg/dL    Albumin 4.7 3.2 - 4.9 g/dL    Total Protein 7.5 6.0 - 8.2 g/dL    Globulin 2.8 1.9 - 3.5 g/dL    A-G Ratio 1.7 g/dL   LACTIC ACID    Collection Time: 12/03/19 12:19 PM   Result Value Ref Range    Lactic Acid 3.1 (H) 0.5 - 2.0 mmol/L   ESTIMATED GFR    Collection Time: 12/03/19 12:19 PM   Result Value Ref Range    GFR If African American >60 >60 mL/min/1.73 m 2    GFR If Non African American >60 >60 mL/min/1.73 m 2           RADIOLOGY      Radiologist interpretation:  MR-LUMBAR SPINE-W/O    (Results Pending)           COURSE & MEDICAL DECISION MAKING    ED COURSE:    ED Observation Status? No, the patient does not qualify for observation    INTERVENTIONS BY ME:  Medications - No data to display      11:19 AM patient was notified it would be approximately 5 hours until the MRI would be able to be completed.  At this point the patient did not want to wait and left AGAINST MEDICAL ADVICE.  I was notified later and unable to speak to the patient prior to his discharge.      INITIAL ASSESSMENT, COURSE AND PLAN  Care Narrative: Presents emerged part for evaluation.  The patient has been dealing with the symptoms for he says over a year and has  been having the bladder symptoms as described above.  I ordered for an MRI laboratory studies and urinalysis to evaluate for other causes for his urinary retention.  MRI was going to be able to be done about 4 or 5:00 in the afternoon.  The patient was told and notified that the MRI was going to be several hours until it could be accomplished the patient did not want a wait for his MRI and left AGAINST MEDICAL ADVICE.      DISPOSITION AND DISCUSSIONS  Patient left AGAINST MEDICAL ADVICE.    FINAL DIAGNOSIS  1. Left against medical advice    2. Chronic midline low back pain, unspecified whether sciatica present    3. Urinary retention        Electronically signed by: Jeison Torres M.D.,11:31 AM 04/09/25

## 2025-04-09 NOTE — ED TRIAGE NOTES
"/85   Pulse (!) 115   Temp 37.4 °C (99.3 °F) (Temporal)   Resp 16   Ht 1.676 m (5' 6\")   Wt 88.5 kg (195 lb 1.7 oz)   SpO2 97%   BMI 31.49 kg/m²   Chief Complaint   Patient presents with    Other     Fell last yr and a half s/p sz fell down stairs   was seen for this  Hx of bulging disc to lower back    since then has had numbness to both legs  and occ to both hands   was seen at pain specialist and told to come to hospital due to now having issues to bladder   having difficulty urinating and having BM   started about a month ago    per pt specialist is concerned about advancing spine issues      Comes in w/ mother      "

## 2025-04-09 NOTE — ED NOTES
"MRI screening completed.  Pt and visitor updated on POC including pending MRI later this afternoon.  Pt stated cannot wait until this afternoon, r/t \"have things to do.\"  ERP aware.  Pt signed out AMA.  Pt ambulated from ED w/ visitor.    "

## 2025-05-22 DIAGNOSIS — M25.561 CHRONIC PAIN OF BOTH KNEES: ICD-10-CM

## 2025-05-22 DIAGNOSIS — G89.29 CHRONIC PAIN OF BOTH KNEES: ICD-10-CM

## 2025-05-22 DIAGNOSIS — G89.29 CHRONIC BILATERAL THORACIC BACK PAIN: ICD-10-CM

## 2025-05-22 DIAGNOSIS — F41.0 GENERALIZED ANXIETY DISORDER WITH PANIC ATTACKS: Primary | ICD-10-CM

## 2025-05-22 DIAGNOSIS — M25.562 CHRONIC PAIN OF BOTH KNEES: ICD-10-CM

## 2025-05-22 DIAGNOSIS — F41.1 GENERALIZED ANXIETY DISORDER WITH PANIC ATTACKS: Primary | ICD-10-CM

## 2025-05-22 DIAGNOSIS — M54.6 CHRONIC BILATERAL THORACIC BACK PAIN: ICD-10-CM

## 2025-05-30 NOTE — Clinical Note
REFERRAL APPROVAL NOTICE         Sent on May 30, 2025                   Abelino Montemayor Obi  [address]  [place] [address]                   Dear Mr. Crocker,    After a careful review of the medical information and benefit coverage, [facility] has processed your referral. See below for additional details.    If applicable, you must be actively enrolled with your insurance for coverage of the authorized service. If you have any questions regarding your coverage, please contact your insurance directly.    REFERRAL INFORMATION   Referral #:  [id number]  Referred-To Department    Referred-By Provider:  Physical Therapy    Devika Moreno M.D.   [facility] [address]      [address]  [place] [address]  [phone] [address]  [address]  [place] [id number] [phone]    Referral Start Date:  05/22/2025  Referral End Date:   05/22/2026             SCHEDULING  If you do not already have an appointment, please call [phone] to make an appointment.     MORE INFORMATION  If you do not already have a [facility] account, sign up at: [email or web address]  You can access your medical information, make appointments, see lab results, billing information, and more.  If you have questions regarding this referral, please contact  the [facility] Referrals department at:             [phone]. Monday - Friday 8:00AM - 5:00PM.     Sincerely,    [facility]

## 2025-05-30 NOTE — Clinical Note
REFERRAL APPROVAL NOTICE         Sent on May 30, 2025                   Abelino Montemayor Obi  [address]  [place] [address]                   Dear Mr. Crocker,    After a careful review of the medical information and benefit coverage, [facility] has processed your referral. See below for additional details.    If applicable, you must be actively enrolled with your insurance for coverage of the authorized service. If you have any questions regarding your coverage, please contact your insurance directly.    REFERRAL INFORMATION   Referral #:  [id number]  Referred-To Provider    Referred-By Provider:  Behavioral Health    Devika Moreno M.D.   [facility]      [address]  [place] [address]  [phone] [address]  [place] [address]  [phone]    Referral Start Date:  05/22/2025  Referral End Date:   05/22/2026             SCHEDULING  If you do not already have an appointment, please call [phone] to make an appointment.     MORE INFORMATION  If you do not already have a [employer] account, sign up at: [email or web address]  You can access your medical information, make appointments, see lab results, billing information, and more.  If you have questions regarding this referral, please contact  the [facility] Referrals department at:             [phone]. Monday - Friday 8:00AM - 5:00PM.     Sincerely,    [facility]

## 2025-07-06 ENCOUNTER — HOSPITAL ENCOUNTER (INPATIENT)
Facility: MEDICAL CENTER | Age: 30
LOS: 2 days | DRG: 176 | End: 2025-07-09
Attending: STUDENT IN AN ORGANIZED HEALTH CARE EDUCATION/TRAINING PROGRAM | Admitting: STUDENT IN AN ORGANIZED HEALTH CARE EDUCATION/TRAINING PROGRAM
Payer: COMMERCIAL

## 2025-07-06 DIAGNOSIS — R06.02 SHORTNESS OF BREATH: ICD-10-CM

## 2025-07-06 DIAGNOSIS — I26.99 PULMONARY INFARCTION (HCC): Primary | ICD-10-CM

## 2025-07-06 DIAGNOSIS — I26.99 PULMONARY INFARCT (HCC): ICD-10-CM

## 2025-07-06 DIAGNOSIS — I82.4Y3 ACUTE DEEP VEIN THROMBOSIS (DVT) OF PROXIMAL VEIN OF BOTH LOWER EXTREMITIES (HCC): ICD-10-CM

## 2025-07-06 DIAGNOSIS — F41.0 GENERALIZED ANXIETY DISORDER WITH PANIC ATTACKS: ICD-10-CM

## 2025-07-06 DIAGNOSIS — R91.1 PULMONARY NODULE: ICD-10-CM

## 2025-07-06 DIAGNOSIS — F10.11 HISTORY OF ALCOHOL ABUSE: ICD-10-CM

## 2025-07-06 DIAGNOSIS — F41.1 GENERALIZED ANXIETY DISORDER WITH PANIC ATTACKS: ICD-10-CM

## 2025-07-06 DIAGNOSIS — E53.8 B12 DEFICIENCY: ICD-10-CM

## 2025-07-06 DIAGNOSIS — E55.9 VITAMIN D DEFICIENCY: ICD-10-CM

## 2025-07-06 DIAGNOSIS — R29.898 WEAKNESS OF BOTH LOWER EXTREMITIES: ICD-10-CM

## 2025-07-06 DIAGNOSIS — I26.99 BILATERAL PULMONARY EMBOLISM (HCC): ICD-10-CM

## 2025-07-06 DIAGNOSIS — G40.909 SEIZURE DISORDER (HCC): ICD-10-CM

## 2025-07-06 DIAGNOSIS — R74.8 ELEVATED LIVER ENZYMES: ICD-10-CM

## 2025-07-06 LAB — EKG IMPRESSION: NORMAL

## 2025-07-06 PROCEDURE — 93005 ELECTROCARDIOGRAM TRACING: CPT | Mod: TC

## 2025-07-06 PROCEDURE — 36415 COLL VENOUS BLD VENIPUNCTURE: CPT

## 2025-07-06 PROCEDURE — 99285 EMERGENCY DEPT VISIT HI MDM: CPT

## 2025-07-06 PROCEDURE — 93005 ELECTROCARDIOGRAM TRACING: CPT | Mod: TC | Performed by: STUDENT IN AN ORGANIZED HEALTH CARE EDUCATION/TRAINING PROGRAM

## 2025-07-06 ASSESSMENT — PAIN DESCRIPTION - PAIN TYPE: TYPE: ACUTE PAIN

## 2025-07-07 ENCOUNTER — APPOINTMENT (OUTPATIENT)
Dept: RADIOLOGY | Facility: MEDICAL CENTER | Age: 30
DRG: 176 | End: 2025-07-07
Attending: STUDENT IN AN ORGANIZED HEALTH CARE EDUCATION/TRAINING PROGRAM
Payer: COMMERCIAL

## 2025-07-07 PROBLEM — I82.409 DVT (DEEP VENOUS THROMBOSIS) (HCC): Status: ACTIVE | Noted: 2025-07-07

## 2025-07-07 PROBLEM — I26.99 PULMONARY INFARCT (HCC): Status: ACTIVE | Noted: 2025-07-07

## 2025-07-07 PROBLEM — I26.99 BILATERAL PULMONARY EMBOLISM (HCC): Status: ACTIVE | Noted: 2025-07-07

## 2025-07-07 PROBLEM — R74.8 ELEVATED LIVER ENZYMES: Status: ACTIVE | Noted: 2025-07-07

## 2025-07-07 PROBLEM — R29.898 WEAKNESS OF BOTH LOWER EXTREMITIES: Status: ACTIVE | Noted: 2025-07-07

## 2025-07-07 PROBLEM — R91.1 PULMONARY NODULE: Status: ACTIVE | Noted: 2025-07-07

## 2025-07-07 PROBLEM — D64.9 NORMOCYTIC ANEMIA: Status: ACTIVE | Noted: 2025-07-07

## 2025-07-07 LAB
ALBUMIN SERPL BCP-MCNC: 4 G/DL (ref 3.2–4.9)
ALBUMIN/GLOB SERPL: 1.3 G/DL
ALP SERPL-CCNC: 86 U/L (ref 30–99)
ALT SERPL-CCNC: 59 U/L (ref 2–50)
ANION GAP SERPL CALC-SCNC: 14 MMOL/L (ref 7–16)
APPEARANCE UR: CLEAR
APPEARANCE UR: CLEAR
AST SERPL-CCNC: 54 U/L (ref 12–45)
BASOPHILS # BLD AUTO: 0.7 % (ref 0–1.8)
BASOPHILS # BLD: 0.05 K/UL (ref 0–0.12)
BILIRUB SERPL-MCNC: 0.4 MG/DL (ref 0.1–1.5)
BILIRUB UR QL STRIP.AUTO: NEGATIVE
BILIRUB UR QL STRIP.AUTO: NEGATIVE
BUN SERPL-MCNC: 5 MG/DL (ref 8–22)
CALCIUM ALBUM COR SERPL-MCNC: 9 MG/DL (ref 8.5–10.5)
CALCIUM SERPL-MCNC: 9 MG/DL (ref 8.5–10.5)
CHLORIDE SERPL-SCNC: 104 MMOL/L (ref 96–112)
CO2 SERPL-SCNC: 22 MMOL/L (ref 20–33)
COLOR UR: YELLOW
COLOR UR: YELLOW
CREAT SERPL-MCNC: 0.76 MG/DL (ref 0.5–1.4)
D DIMER PPP IA.FEU-MCNC: 6.56 UG/ML (FEU) (ref 0–0.5)
EOSINOPHIL # BLD AUTO: 0.3 K/UL (ref 0–0.51)
EOSINOPHIL NFR BLD: 3.9 % (ref 0–6.9)
ERYTHROCYTE [DISTWIDTH] IN BLOOD BY AUTOMATED COUNT: 56.4 FL (ref 35.9–50)
FERRITIN SERPL-MCNC: 305 NG/ML (ref 22–322)
GFR SERPLBLD CREATININE-BSD FMLA CKD-EPI: 125 ML/MIN/1.73 M 2
GLOBULIN SER CALC-MCNC: 3.2 G/DL (ref 1.9–3.5)
GLUCOSE SERPL-MCNC: 87 MG/DL (ref 65–99)
GLUCOSE UR STRIP.AUTO-MCNC: NEGATIVE MG/DL
GLUCOSE UR STRIP.AUTO-MCNC: NEGATIVE MG/DL
HCT VFR BLD AUTO: 33.7 % (ref 42–52)
HGB BLD-MCNC: 10.9 G/DL (ref 14–18)
HGB RETIC QN AUTO: 30.8 PG/CELL (ref 29–35)
IMM GRANULOCYTES # BLD AUTO: 0.03 K/UL (ref 0–0.11)
IMM GRANULOCYTES NFR BLD AUTO: 0.4 % (ref 0–0.9)
IMM RETICS NFR: 10.1 % (ref 2.6–16.1)
IRON SATN MFR SERPL: 8 % (ref 15–55)
IRON SERPL-MCNC: 18 UG/DL (ref 50–180)
KETONES UR STRIP.AUTO-MCNC: NEGATIVE MG/DL
KETONES UR STRIP.AUTO-MCNC: NEGATIVE MG/DL
LEUKOCYTE ESTERASE UR QL STRIP.AUTO: NEGATIVE
LEUKOCYTE ESTERASE UR QL STRIP.AUTO: NEGATIVE
LYMPHOCYTES # BLD AUTO: 1.84 K/UL (ref 1–4.8)
LYMPHOCYTES NFR BLD: 24.1 % (ref 22–41)
MCH RBC QN AUTO: 29.5 PG (ref 27–33)
MCHC RBC AUTO-ENTMCNC: 32.3 G/DL (ref 32.3–36.5)
MCV RBC AUTO: 91.3 FL (ref 81.4–97.8)
MICRO URNS: NORMAL
MICRO URNS: NORMAL
MONOCYTES # BLD AUTO: 0.48 K/UL (ref 0–0.85)
MONOCYTES NFR BLD AUTO: 6.3 % (ref 0–13.4)
NEUTROPHILS # BLD AUTO: 4.93 K/UL (ref 1.82–7.42)
NEUTROPHILS NFR BLD: 64.6 % (ref 44–72)
NITRITE UR QL STRIP.AUTO: NEGATIVE
NITRITE UR QL STRIP.AUTO: NEGATIVE
NRBC # BLD AUTO: 0 K/UL
NRBC BLD-RTO: 0 /100 WBC (ref 0–0.2)
NT-PROBNP SERPL IA-MCNC: 170 PG/ML (ref 0–125)
PH UR STRIP.AUTO: 6.5 [PH] (ref 5–8)
PH UR STRIP.AUTO: 7 [PH] (ref 5–8)
PLATELET # BLD AUTO: 361 K/UL (ref 164–446)
PMV BLD AUTO: 9.9 FL (ref 9–12.9)
POTASSIUM SERPL-SCNC: 3.8 MMOL/L (ref 3.6–5.5)
PROT SERPL-MCNC: 7.2 G/DL (ref 6–8.2)
PROT UR QL STRIP: NEGATIVE MG/DL
PROT UR QL STRIP: NEGATIVE MG/DL
RBC # BLD AUTO: 3.69 M/UL (ref 4.7–6.1)
RBC UR QL AUTO: NEGATIVE
RBC UR QL AUTO: NEGATIVE
RETICS # AUTO: 0.06 M/UL (ref 0.04–0.12)
RETICS/RBC NFR: 1.5 % (ref 0.8–2.6)
SODIUM SERPL-SCNC: 140 MMOL/L (ref 135–145)
SP GR UR STRIP.AUTO: 1.01
SP GR UR STRIP.AUTO: 1.02
TIBC SERPL-MCNC: 230 UG/DL (ref 250–450)
TROPONIN T SERPL-MCNC: 9 NG/L (ref 6–19)
TSH SERPL DL<=0.005 MIU/L-ACNC: 2.52 UIU/ML (ref 0.38–5.33)
UIBC SERPL-MCNC: 212 UG/DL (ref 110–370)
UROBILINOGEN UR STRIP.AUTO-MCNC: 1 EU/DL
UROBILINOGEN UR STRIP.AUTO-MCNC: 1 EU/DL
WBC # BLD AUTO: 7.6 K/UL (ref 4.8–10.8)

## 2025-07-07 PROCEDURE — A9270 NON-COVERED ITEM OR SERVICE: HCPCS | Performed by: STUDENT IN AN ORGANIZED HEALTH CARE EDUCATION/TRAINING PROGRAM

## 2025-07-07 PROCEDURE — 700111 HCHG RX REV CODE 636 W/ 250 OVERRIDE (IP): Performed by: STUDENT IN AN ORGANIZED HEALTH CARE EDUCATION/TRAINING PROGRAM

## 2025-07-07 PROCEDURE — 96372 THER/PROPH/DIAG INJ SC/IM: CPT

## 2025-07-07 PROCEDURE — 85025 COMPLETE CBC W/AUTO DIFF WBC: CPT

## 2025-07-07 PROCEDURE — 700117 HCHG RX CONTRAST REV CODE 255: Mod: UD | Performed by: STUDENT IN AN ORGANIZED HEALTH CARE EDUCATION/TRAINING PROGRAM

## 2025-07-07 PROCEDURE — 96374 THER/PROPH/DIAG INJ IV PUSH: CPT

## 2025-07-07 PROCEDURE — 83540 ASSAY OF IRON: CPT

## 2025-07-07 PROCEDURE — 83550 IRON BINDING TEST: CPT

## 2025-07-07 PROCEDURE — 83880 ASSAY OF NATRIURETIC PEPTIDE: CPT

## 2025-07-07 PROCEDURE — A9270 NON-COVERED ITEM OR SERVICE: HCPCS | Mod: UD | Performed by: STUDENT IN AN ORGANIZED HEALTH CARE EDUCATION/TRAINING PROGRAM

## 2025-07-07 PROCEDURE — 700102 HCHG RX REV CODE 250 W/ 637 OVERRIDE(OP): Performed by: STUDENT IN AN ORGANIZED HEALTH CARE EDUCATION/TRAINING PROGRAM

## 2025-07-07 PROCEDURE — 71275 CT ANGIOGRAPHY CHEST: CPT

## 2025-07-07 PROCEDURE — A9270 NON-COVERED ITEM OR SERVICE: HCPCS | Performed by: NURSE PRACTITIONER

## 2025-07-07 PROCEDURE — 84443 ASSAY THYROID STIM HORMONE: CPT

## 2025-07-07 PROCEDURE — 770020 HCHG ROOM/CARE - TELE (206)

## 2025-07-07 PROCEDURE — 72158 MRI LUMBAR SPINE W/O & W/DYE: CPT

## 2025-07-07 PROCEDURE — 81003 URINALYSIS AUTO W/O SCOPE: CPT

## 2025-07-07 PROCEDURE — A9270 NON-COVERED ITEM OR SERVICE: HCPCS

## 2025-07-07 PROCEDURE — 99291 CRITICAL CARE FIRST HOUR: CPT | Performed by: STUDENT IN AN ORGANIZED HEALTH CARE EDUCATION/TRAINING PROGRAM

## 2025-07-07 PROCEDURE — 82728 ASSAY OF FERRITIN: CPT

## 2025-07-07 PROCEDURE — 96375 TX/PRO/DX INJ NEW DRUG ADDON: CPT

## 2025-07-07 PROCEDURE — 96376 TX/PRO/DX INJ SAME DRUG ADON: CPT

## 2025-07-07 PROCEDURE — 700117 HCHG RX CONTRAST REV CODE 255: Mod: JZ | Performed by: STUDENT IN AN ORGANIZED HEALTH CARE EDUCATION/TRAINING PROGRAM

## 2025-07-07 PROCEDURE — 700102 HCHG RX REV CODE 250 W/ 637 OVERRIDE(OP): Mod: UD | Performed by: STUDENT IN AN ORGANIZED HEALTH CARE EDUCATION/TRAINING PROGRAM

## 2025-07-07 PROCEDURE — 71045 X-RAY EXAM CHEST 1 VIEW: CPT

## 2025-07-07 PROCEDURE — A9579 GAD-BASE MR CONTRAST NOS,1ML: HCPCS | Mod: JZ | Performed by: STUDENT IN AN ORGANIZED HEALTH CARE EDUCATION/TRAINING PROGRAM

## 2025-07-07 PROCEDURE — 80053 COMPREHEN METABOLIC PANEL: CPT

## 2025-07-07 PROCEDURE — 36415 COLL VENOUS BLD VENIPUNCTURE: CPT

## 2025-07-07 PROCEDURE — 93970 EXTREMITY STUDY: CPT

## 2025-07-07 PROCEDURE — 700101 HCHG RX REV CODE 250: Mod: UD | Performed by: STUDENT IN AN ORGANIZED HEALTH CARE EDUCATION/TRAINING PROGRAM

## 2025-07-07 PROCEDURE — 73620 X-RAY EXAM OF FOOT: CPT | Mod: RT

## 2025-07-07 PROCEDURE — 73600 X-RAY EXAM OF ANKLE: CPT | Mod: RT

## 2025-07-07 PROCEDURE — 700111 HCHG RX REV CODE 636 W/ 250 OVERRIDE (IP)

## 2025-07-07 PROCEDURE — 700102 HCHG RX REV CODE 250 W/ 637 OVERRIDE(OP): Performed by: NURSE PRACTITIONER

## 2025-07-07 PROCEDURE — 700102 HCHG RX REV CODE 250 W/ 637 OVERRIDE(OP)

## 2025-07-07 PROCEDURE — 72157 MRI CHEST SPINE W/O & W/DYE: CPT

## 2025-07-07 PROCEDURE — 84484 ASSAY OF TROPONIN QUANT: CPT

## 2025-07-07 PROCEDURE — 85046 RETICYTE/HGB CONCENTRATE: CPT

## 2025-07-07 PROCEDURE — 85379 FIBRIN DEGRADATION QUANT: CPT

## 2025-07-07 RX ORDER — GADOTERIDOL 279.3 MG/ML
20 INJECTION INTRAVENOUS ONCE
Status: COMPLETED | OUTPATIENT
Start: 2025-07-07 | End: 2025-07-07

## 2025-07-07 RX ORDER — DEXAMETHASONE SODIUM PHOSPHATE 4 MG/ML
10 INJECTION, SOLUTION INTRA-ARTICULAR; INTRALESIONAL; INTRAMUSCULAR; INTRAVENOUS; SOFT TISSUE ONCE
Status: COMPLETED | OUTPATIENT
Start: 2025-07-07 | End: 2025-07-07

## 2025-07-07 RX ORDER — ONDANSETRON 2 MG/ML
4 INJECTION INTRAMUSCULAR; INTRAVENOUS EVERY 4 HOURS PRN
Status: DISCONTINUED | OUTPATIENT
Start: 2025-07-07 | End: 2025-07-09 | Stop reason: HOSPADM

## 2025-07-07 RX ORDER — OXYCODONE HYDROCHLORIDE 5 MG/1
5 TABLET ORAL EVERY 4 HOURS PRN
Refills: 0 | Status: DISCONTINUED | OUTPATIENT
Start: 2025-07-07 | End: 2025-07-09 | Stop reason: HOSPADM

## 2025-07-07 RX ORDER — OXYCODONE HYDROCHLORIDE 5 MG/1
2.5 TABLET ORAL EVERY 4 HOURS PRN
Refills: 0 | Status: DISCONTINUED | OUTPATIENT
Start: 2025-07-07 | End: 2025-07-07

## 2025-07-07 RX ORDER — LIDOCAINE 4 G/G
1 PATCH TOPICAL ONCE
Status: COMPLETED | OUTPATIENT
Start: 2025-07-07 | End: 2025-07-07

## 2025-07-07 RX ORDER — ENOXAPARIN SODIUM 100 MG/ML
100 INJECTION SUBCUTANEOUS ONCE
Status: COMPLETED | OUTPATIENT
Start: 2025-07-07 | End: 2025-07-07

## 2025-07-07 RX ORDER — HYDROXYZINE HYDROCHLORIDE 25 MG/1
25 TABLET, FILM COATED ORAL 3 TIMES DAILY PRN
Status: DISCONTINUED | OUTPATIENT
Start: 2025-07-07 | End: 2025-07-07

## 2025-07-07 RX ORDER — HYDROMORPHONE HYDROCHLORIDE 1 MG/ML
0.25 INJECTION, SOLUTION INTRAMUSCULAR; INTRAVENOUS; SUBCUTANEOUS EVERY 4 HOURS PRN
Status: DISCONTINUED | OUTPATIENT
Start: 2025-07-07 | End: 2025-07-07

## 2025-07-07 RX ORDER — AMOXICILLIN 250 MG
2 CAPSULE ORAL EVERY EVENING
Status: DISCONTINUED | OUTPATIENT
Start: 2025-07-07 | End: 2025-07-09 | Stop reason: HOSPADM

## 2025-07-07 RX ORDER — ALBUTEROL SULFATE 90 UG/1
2 INHALANT RESPIRATORY (INHALATION) EVERY 4 HOURS PRN
Status: DISCONTINUED | OUTPATIENT
Start: 2025-07-07 | End: 2025-07-09 | Stop reason: HOSPADM

## 2025-07-07 RX ORDER — LAMOTRIGINE 100 MG/1
100 TABLET ORAL DAILY
Status: DISCONTINUED | OUTPATIENT
Start: 2025-07-07 | End: 2025-07-09 | Stop reason: HOSPADM

## 2025-07-07 RX ORDER — HYDRALAZINE HYDROCHLORIDE 20 MG/ML
20 INJECTION INTRAMUSCULAR; INTRAVENOUS EVERY 4 HOURS PRN
Status: DISCONTINUED | OUTPATIENT
Start: 2025-07-07 | End: 2025-07-09 | Stop reason: HOSPADM

## 2025-07-07 RX ORDER — ALBUTEROL SULFATE 90 UG/1
4 INHALANT RESPIRATORY (INHALATION) ONCE
Status: COMPLETED | OUTPATIENT
Start: 2025-07-07 | End: 2025-07-07

## 2025-07-07 RX ORDER — DIAZEPAM 10 MG/2ML
5 INJECTION, SOLUTION INTRAMUSCULAR; INTRAVENOUS
Status: COMPLETED | OUTPATIENT
Start: 2025-07-07 | End: 2025-07-07

## 2025-07-07 RX ORDER — LIDOCAINE 4 G/G
1 PATCH TOPICAL DAILY
Status: DISCONTINUED | OUTPATIENT
Start: 2025-07-07 | End: 2025-07-07

## 2025-07-07 RX ORDER — IBUPROFEN 200 MG
800 TABLET ORAL EVERY 8 HOURS PRN
Status: ON HOLD | COMMUNITY
End: 2025-07-09

## 2025-07-07 RX ORDER — HYDROXYZINE HYDROCHLORIDE 25 MG/1
25 TABLET, FILM COATED ORAL 3 TIMES DAILY PRN
Status: DISCONTINUED | OUTPATIENT
Start: 2025-07-07 | End: 2025-07-09 | Stop reason: HOSPADM

## 2025-07-07 RX ORDER — CYCLOBENZAPRINE HCL 10 MG
10 TABLET ORAL ONCE
Status: COMPLETED | OUTPATIENT
Start: 2025-07-07 | End: 2025-07-07

## 2025-07-07 RX ORDER — POLYETHYLENE GLYCOL 3350 17 G/17G
1 POWDER, FOR SOLUTION ORAL
Status: DISCONTINUED | OUTPATIENT
Start: 2025-07-07 | End: 2025-07-09 | Stop reason: HOSPADM

## 2025-07-07 RX ORDER — OXYCODONE HYDROCHLORIDE 10 MG/1
10 TABLET ORAL EVERY 4 HOURS PRN
Refills: 0 | Status: DISCONTINUED | OUTPATIENT
Start: 2025-07-07 | End: 2025-07-09 | Stop reason: HOSPADM

## 2025-07-07 RX ORDER — CYCLOBENZAPRINE HCL 10 MG
10 TABLET ORAL 3 TIMES DAILY PRN
Status: DISCONTINUED | OUTPATIENT
Start: 2025-07-07 | End: 2025-07-07

## 2025-07-07 RX ORDER — ENOXAPARIN SODIUM 100 MG/ML
1 INJECTION SUBCUTANEOUS EVERY 12 HOURS
Status: DISCONTINUED | OUTPATIENT
Start: 2025-07-07 | End: 2025-07-09 | Stop reason: HOSPADM

## 2025-07-07 RX ORDER — OXYCODONE HYDROCHLORIDE 5 MG/1
5 TABLET ORAL EVERY 4 HOURS PRN
Refills: 0 | Status: DISCONTINUED | OUTPATIENT
Start: 2025-07-07 | End: 2025-07-07

## 2025-07-07 RX ORDER — ACETAMINOPHEN 500 MG
1000 TABLET ORAL EVERY 6 HOURS PRN
Status: DISCONTINUED | OUTPATIENT
Start: 2025-07-12 | End: 2025-07-09 | Stop reason: HOSPADM

## 2025-07-07 RX ORDER — ONDANSETRON 4 MG/1
4 TABLET, ORALLY DISINTEGRATING ORAL EVERY 4 HOURS PRN
Status: DISCONTINUED | OUTPATIENT
Start: 2025-07-07 | End: 2025-07-09 | Stop reason: HOSPADM

## 2025-07-07 RX ORDER — HYDROMORPHONE HYDROCHLORIDE 1 MG/ML
0.5 INJECTION, SOLUTION INTRAMUSCULAR; INTRAVENOUS; SUBCUTANEOUS EVERY 4 HOURS PRN
Status: DISCONTINUED | OUTPATIENT
Start: 2025-07-07 | End: 2025-07-09 | Stop reason: HOSPADM

## 2025-07-07 RX ORDER — PROMETHAZINE HYDROCHLORIDE 25 MG/1
12.5-25 SUPPOSITORY RECTAL EVERY 4 HOURS PRN
Status: DISCONTINUED | OUTPATIENT
Start: 2025-07-07 | End: 2025-07-09 | Stop reason: HOSPADM

## 2025-07-07 RX ORDER — ACETAMINOPHEN 500 MG
1000 TABLET ORAL ONCE
Status: COMPLETED | OUTPATIENT
Start: 2025-07-07 | End: 2025-07-07

## 2025-07-07 RX ORDER — ACETAMINOPHEN 500 MG
1000 TABLET ORAL EVERY 6 HOURS
Status: DISCONTINUED | OUTPATIENT
Start: 2025-07-07 | End: 2025-07-09 | Stop reason: HOSPADM

## 2025-07-07 RX ORDER — PROCHLORPERAZINE EDISYLATE 5 MG/ML
5-10 INJECTION INTRAMUSCULAR; INTRAVENOUS EVERY 4 HOURS PRN
Status: DISCONTINUED | OUTPATIENT
Start: 2025-07-07 | End: 2025-07-09 | Stop reason: HOSPADM

## 2025-07-07 RX ORDER — DEXAMETHASONE SODIUM PHOSPHATE 4 MG/ML
4 INJECTION, SOLUTION INTRA-ARTICULAR; INTRALESIONAL; INTRAMUSCULAR; INTRAVENOUS; SOFT TISSUE EVERY 6 HOURS
Status: DISCONTINUED | OUTPATIENT
Start: 2025-07-07 | End: 2025-07-08

## 2025-07-07 RX ORDER — PROMETHAZINE HYDROCHLORIDE 25 MG/1
12.5-25 TABLET ORAL EVERY 4 HOURS PRN
Status: DISCONTINUED | OUTPATIENT
Start: 2025-07-07 | End: 2025-07-09 | Stop reason: HOSPADM

## 2025-07-07 RX ADMIN — ACETAMINOPHEN 1000 MG: 500 TABLET ORAL at 01:43

## 2025-07-07 RX ADMIN — ACETAMINOPHEN 1000 MG: 500 TABLET ORAL at 14:00

## 2025-07-07 RX ADMIN — GADOTERIDOL 20 ML: 279.3 INJECTION, SOLUTION INTRAVENOUS at 09:13

## 2025-07-07 RX ADMIN — DEXAMETHASONE SODIUM PHOSPHATE 4 MG: 4 INJECTION, SOLUTION INTRAMUSCULAR; INTRAVENOUS at 10:20

## 2025-07-07 RX ADMIN — ALBUTEROL SULFATE 4 PUFF: 90 AEROSOL, METERED RESPIRATORY (INHALATION) at 01:43

## 2025-07-07 RX ADMIN — IOHEXOL 66 ML: 350 INJECTION, SOLUTION INTRAVENOUS at 03:30

## 2025-07-07 RX ADMIN — ACETAMINOPHEN 1000 MG: 500 TABLET ORAL at 22:00

## 2025-07-07 RX ADMIN — OXYCODONE HYDROCHLORIDE 10 MG: 10 TABLET ORAL at 10:19

## 2025-07-07 RX ADMIN — LAMOTRIGINE 100 MG: 100 TABLET ORAL at 05:40

## 2025-07-07 RX ADMIN — OXYCODONE HYDROCHLORIDE 10 MG: 10 TABLET ORAL at 17:17

## 2025-07-07 RX ADMIN — DEXAMETHASONE SODIUM PHOSPHATE 10 MG: 4 INJECTION, SOLUTION INTRA-ARTICULAR; INTRALESIONAL; INTRAMUSCULAR; INTRAVENOUS; SOFT TISSUE at 05:40

## 2025-07-07 RX ADMIN — DIAZEPAM 5 MG: 10 INJECTION, SOLUTION INTRAMUSCULAR; INTRAVENOUS at 08:29

## 2025-07-07 RX ADMIN — LIDOCAINE 1 PATCH: 4 PATCH TOPICAL at 03:08

## 2025-07-07 RX ADMIN — OXYCODONE 5 MG: 5 TABLET ORAL at 05:39

## 2025-07-07 RX ADMIN — CYCLOBENZAPRINE 10 MG: 10 TABLET, FILM COATED ORAL at 01:43

## 2025-07-07 RX ADMIN — DEXAMETHASONE SODIUM PHOSPHATE 4 MG: 4 INJECTION, SOLUTION INTRAMUSCULAR; INTRAVENOUS at 17:08

## 2025-07-07 RX ADMIN — ENOXAPARIN SODIUM 100 MG: 100 INJECTION SUBCUTANEOUS at 05:40

## 2025-07-07 RX ADMIN — ENOXAPARIN SODIUM 100 MG: 100 INJECTION SUBCUTANEOUS at 17:08

## 2025-07-07 RX ADMIN — ACETAMINOPHEN 1000 MG: 500 TABLET ORAL at 07:43

## 2025-07-07 RX ADMIN — HYDROXYZINE HYDROCHLORIDE 25 MG: 25 TABLET ORAL at 22:00

## 2025-07-07 ASSESSMENT — ENCOUNTER SYMPTOMS
CHILLS: 0
PALPITATIONS: 0
DIARRHEA: 0
FEVER: 0
COUGH: 0
DIZZINESS: 0
FOCAL WEAKNESS: 1
NAUSEA: 0
WHEEZING: 0
EYE PAIN: 0
DOUBLE VISION: 0
CONSTIPATION: 0
SHORTNESS OF BREATH: 1
HEARTBURN: 0
BACK PAIN: 0
BLOOD IN STOOL: 0
NECK PAIN: 0
ABDOMINAL PAIN: 0
VOMITING: 0
HEADACHES: 0
BLURRED VISION: 0
TINGLING: 1

## 2025-07-07 ASSESSMENT — PAIN DESCRIPTION - PAIN TYPE
TYPE: ACUTE PAIN
TYPE: ACUTE PAIN;CHRONIC PAIN
TYPE: ACUTE PAIN
TYPE: ACUTE PAIN

## 2025-07-07 ASSESSMENT — HEART SCORE
RISK FACTORS: 1-2 RISK FACTORS
HISTORY: SLIGHTLY SUSPICIOUS
HEART SCORE: 2
ECG: NON-SPECIFIC REPOLARIZATION DISTURBANCE
AGE: <45
TROPONIN: LESS THAN OR EQUAL TO NORMAL LIMIT

## 2025-07-07 ASSESSMENT — COPD QUESTIONNAIRES
COPD SCREENING SCORE: 1
DO YOU EVER COUGH UP ANY MUCUS OR PHLEGM?: NO/ONLY WITH OCCASIONAL COLDS OR INFECTIONS
HAVE YOU SMOKED AT LEAST 100 CIGARETTES IN YOUR ENTIRE LIFE: NO/DON'T KNOW
DURING THE PAST 4 WEEKS HOW MUCH DID YOU FEEL SHORT OF BREATH: SOME OF THE TIME

## 2025-07-07 ASSESSMENT — LIFESTYLE VARIABLES: DO YOU DRINK ALCOHOL: NO

## 2025-07-07 NOTE — ASSESSMENT & PLAN NOTE
Not on any medications  Patient lost his wife back in 2023.  He is a single father.  He does have a fiancé now.  Appears she does have good social support.

## 2025-07-07 NOTE — ASSESSMENT & PLAN NOTE
CT showing RML consolidation favoring pulmonary infarction.  Due to PE  Referral to pulmonology ordered

## 2025-07-07 NOTE — HOSPITAL COURSE
Patient is a 30-year-old male with past medical history of seizure, anxiety, presents with 1 week history of shortness of breath and pleuritic chest pain, also with 6-week history of lower extremity weakness with bowel and bladder incontinence.     Patient recently seen at [facility] emergency department on 5/17/2025 for right foot pain after fall.  Right foot x-ray at that time without acute abnormality.     Patient states that after their fall and ankle sprain on 5/17/2025 they have been relatively immobile.  Patient states that patient states that over the past week they have been having shortness of breath with pleuritic chest pain.  States that approximately 6 weeks ago they started having lower extremity weakness with intermittent bowel and bladder incontinence.  Denies saddle anesthesia.     In the ED, BP 90s to 130s/50s to 70s, HR 90s to 110s, RR 16-20, afebrile, saturating well on room air.  Received cyclobenzaprine 10 mg p.o. x 1, albuterol inhaler x 1, Tylenol 1 g p.o. x 1, lidocaine patch x 1, enoxaparin 100 mg IV x 1.  WBC 7.6, hemoglobin 10.9, , sodium 140, potassium 3.8, bicarb 22, anion gap 14, BUN 5, creatinine 0.76, AST 54, ALT 59, alk phos 86, T. bili 0.4, troponin 9, NT proBNP 170, D-dimer 6.56.  UA unremarkable.  Right ankle x-ray without acute abnormality.  Right foot x-ray without acute abnormality.  Chest x-ray with hazy right lower lobe infiltrates.  CT PE with RML and RLL PE, left lower lobe subsegmental PE, no radiographic sign of right heart strain, right middle lobe consolidation appearance favoring infarct, small layering right pleural effusion, 6.0 mm RLL pulm nodule.  EKG sinus tachycardia , QTc 474 with [id number], without ST changes.

## 2025-07-07 NOTE — ASSESSMENT & PLAN NOTE
AST 54, ALT 59, alk phos 86, T. bili 0.4.  Denies abdominal pain  I ordered hepatic function panel tomorrow

## 2025-07-07 NOTE — ED NOTES
Break RN: Pt resting in room. Family at bedside. Cardiac monitor on NSR noted. Oxygen on, pulse ox on. Vs stable. Call light in place. Will continue to monitor.

## 2025-07-07 NOTE — H&P
[facility] Medicine History & Physical Note    Date of Service  7/7/2025    Primary Care Physician  Devika Moreno M.D.    Consultants  none    Specialist Names: N/A    Code Status  Full Code    Chief Complaint  Chief Complaint   Patient presents with    Shortness of Breath     X1 week on and off    Chest Pain     X1 week on and off, worsening when taking a deep breath or coughing     Other     Pt reporting blood in urine and losing control of bowel and bladder x2 weeks.        History of Presenting Illness  Patient is a 30-year-old male with past medical history of seizure, anxiety, presents with 1 week history of shortness of breath and pleuritic chest pain, also with 6-week history of lower extremity weakness with bowel and bladder incontinence.    Patient recently seen at [facility] emergency department on 5/17/2025 for right foot pain after fall.  Right foot x-ray at that time without acute abnormality.    Patient states that after their fall and ankle sprain on 5/17/2025 they have been relatively immobile.  Patient states that patient states that over the past week they have been having shortness of breath with pleuritic chest pain.  States that approximately 6 weeks ago they started having lower extremity weakness with intermittent bowel and bladder incontinence.  Denies saddle anesthesia.    In the ED, BP 90s to 130s/50s to 70s, HR 90s to 110s, RR 16-20, afebrile, saturating well on room air.  Received cyclobenzaprine 10 mg p.o. x 1, albuterol inhaler x 1, Tylenol 1 g p.o. x 1, lidocaine patch x 1, enoxaparin 100 mg IV x 1.  WBC 7.6, hemoglobin 10.9, , sodium 140, potassium 3.8, bicarb 22, anion gap 14, BUN 5, creatinine 0.76, AST 54, ALT 59, alk phos 86, T. bili 0.4, troponin 9, NT proBNP 170, D-dimer 6.56.  UA unremarkable.  Right ankle x-ray without acute abnormality.  Right foot x-ray without acute abnormality.  Chest x-ray with hazy right lower lobe infiltrates.  CT PE with RML and RLL  PE, left lower lobe subsegmental PE, no radiographic sign of right heart strain, right middle lobe consolidation appearance favoring infarct, small layering right pleural effusion, 6.0 mm RLL pulm nodule.  EKG sinus tachycardia , QTc 474 with S1Q3T3, without ST changes.    I discussed the plan of care with patient.    Review of Systems  Review of Systems   Constitutional:  Negative for chills and fever.   HENT:  Negative for ear pain.    Eyes:  Negative for blurred vision, double vision and pain.   Respiratory:  Positive for shortness of breath. Negative for cough and wheezing.    Cardiovascular:  Positive for chest pain (Pleuritic). Negative for palpitations and leg swelling.   Gastrointestinal:  Negative for abdominal pain, blood in stool, constipation, diarrhea, melena, nausea and vomiting.   Genitourinary:  Negative for dysuria, frequency and hematuria.        Intermittent bowel and bladder incontinence   Musculoskeletal:  Negative for back pain, joint pain and neck pain.   Neurological:  Positive for focal weakness (Bilateral lower extremities). Negative for dizziness and headaches.       Past Medical History   has a past medical history of ASTHMA and Seizure disorder (HCC).    Surgical History   has no past surgical history on file.     Family History  Family History   Problem Relation Age of Onset    Asthma Mother     Diabetes Maternal Grandfather     Hypertension Maternal Grandfather     Hyperlipidemia Maternal Grandfather     Cancer Paternal Grandmother     No Known Problems Son     Heart Disease Neg Hx     Stroke Neg Hx         Family history reviewed with patient. There is no family history that is pertinent to the chief complaint.     Social History   reports that he has never smoked. He has never used smokeless tobacco. He reports that he does not currently use alcohol. He reports current drug use. Frequency: 7.00 times per week. Drugs: Marijuana and  Inhaled.    Allergies  Allergies[1]    Medications  Prior to Admission Medications   Prescriptions Last Dose Informant Patient Reported? Taking?   Multiple Vitamin (MULTIVITAMIN PO)   Yes No   Sig: Take  by mouth.   cyclobenzaprine (FLEXERIL) 10 mg Tab   No No   Sig: TAKE 1 TABLET BY MOUTH THREE TIMES DAILY AS NEEDED FOR MUSCLE SPASM OR MODERATE PAIN   hydrOXYzine HCl (ATARAX) 25 MG Tab   No No   Sig: TAKE 1 TABLET BY MOUTH THREE TIMES DAILY AS NEEDED FOR ANXIETY AND FOR SLEEP   lamoTRIgine (LAMICTAL) 100 MG Tab   No No   Sig: Take 1 Tablet by mouth every day.   lamoTRIgine (LAMICTAL) 100 MG Tab   No No   Sig: Take 1 Tablet by mouth every day.      Facility-Administered Medications: None       Physical Exam  Temp:  [37 °C (98.6 °F)] 37 °C (98.6 °F)  Pulse:  [] 92  Resp:  [14-20] 20  BP: ()/(52-78) 94/52  SpO2:  [90 %-95 %] 91 %  Blood Pressure: 94/52   Temperature: 37 °C (98.6 °F)   Pulse: 92   Respiration: 20   Pulse Oximetry: 91 %       Physical Exam  Vitals reviewed.   Constitutional:       General: He is not in acute distress.     Appearance: He is obese. He is not ill-appearing, toxic-appearing or diaphoretic.   HENT:      Head: Normocephalic and atraumatic.      Mouth/Throat:      Mouth: Mucous membranes are moist.      Pharynx: No oropharyngeal exudate or posterior oropharyngeal erythema.   Eyes:      General: No scleral icterus.     Extraocular Movements: Extraocular movements intact.      Conjunctiva/sclera: Conjunctivae normal.   Cardiovascular:      Rate and Rhythm: Normal rate and regular rhythm.      Heart sounds: Normal heart sounds. No murmur heard.     No friction rub. No gallop.   Pulmonary:      Effort: Pulmonary effort is normal. No respiratory distress.      Breath sounds: No stridor. No wheezing, rhonchi or rales.      Comments: Diffuse diminished breath sounds  Abdominal:      General: Abdomen is flat. There is no distension.      Palpations: Abdomen is soft. There is no mass.       Tenderness: There is no abdominal tenderness. There is no guarding or rebound.      Hernia: No hernia is present.   Musculoskeletal:         General: No swelling or tenderness. Normal range of motion.      Cervical back: Neck supple. No rigidity.      Right lower leg: No edema.      Left lower leg: No edema.      Comments: Lumbar spinal tenderness to palpation, without paraspinal tenderness   Lymphadenopathy:      Cervical: No cervical adenopathy.   Skin:     General: Skin is warm and dry.      Coloration: Skin is not jaundiced.   Neurological:      Mental Status: He is alert and oriented to person, place, and time. Mental status is at baseline.      Cranial Nerves: No cranial nerve deficit.      Motor: Weakness present.      Comments: 3/5 strength in bilateral lower extremities at the hips, knees, ankles         Laboratory:  Recent Labs     07/07/25  0058   WBC 7.6   RBC 3.69*   HEMOGLOBIN 10.9*   HEMATOCRIT 33.7*   MCV 91.3   MCH 29.5   MCHC 32.3   RDW 56.4*   PLATELETCT 361   MPV 9.9     Recent Labs     07/07/25  0058   SODIUM 140   POTASSIUM 3.8   CHLORIDE 104   CO2 22   GLUCOSE 87   BUN 5*   CREATININE 0.76   CALCIUM 9.0     Recent Labs     07/07/25  0058   ALTSGPT 59*   ASTSGOT 54*   ALKPHOSPHAT 86   TBILIRUBIN 0.4   GLUCOSE 87         Recent Labs     07/07/25  0058   NTPROBNP 170*         Recent Labs     07/07/25  0058   TROPONINT 9       Imaging:  US-EXTREMITY VENOUS LOWER BILAT         CT-CTA CHEST PULMONARY ARTERY W/ RECONS   Final Result         1.  Right middle lobe and right lower lobe pulmonary emboli.   2.  Left lower lobe subsegmental pulmonary emboli.   3.  No radiographic findings right heart strain.   4.  Right middle lobe consolidation, appearance favoring pulmonary infarct.   5.  Small layering right pleural effusion.   6.  Pulmonary nodule, see nodule follow-up recommendations below.      Fleischner Society pulmonary nodule recommendations:   Low Risk: CT at 6-12 months, then consider CT at  18-24 months      High Risk: CT at 6-12 months, then CT at 18-24 months      Low Risk - Minimal or absent history of smoking and of other known risk factors.      High Risk - History of smoking or of other known risk factors.      Note: These recommendations do not apply to lung cancer screening, patients with immunosuppression, or patients with known primary cancer.      Fleischner Society 2017 Guidelines for Management of Incidentally Detected Pulmonary Nodules in Adults      These findings were discussed with the patient's clinician, Stephane Snyder, on 7/7/2025 4:12 AM.      DX-ANKLE 2- VIEWS RIGHT   Final Result         1.  No radiographic evidence of acute traumatic injury.      DX-FOOT-2- RIGHT   Final Result         1.  No acute traumatic bony injury.      DX-CHEST-PORTABLE (1 VIEW)   Final Result         1.  Hazy right lower lobe infiltrates.      EC-ECHOCARDIOGRAM COMPLETE W/O CONT    (Results Pending)   MR-LUMBAR SPINE-WITH & W/O    (Results Pending)   MR-THORACIC SPINE-WITH & W/O    (Results Pending)       X-Ray:  I have personally reviewed the images and compared with prior images.  EKG:  I have personally reviewed the images and compared with prior images.    Assessment/Plan:  Justification for Admission Status  I anticipate this patient will require at least two midnights for appropriate medical management, necessitating inpatient admission because bilateral PE requiring anticoagulation and further workup    Patient will need a Telemetry bed on MEDICAL service .  The need is secondary to bilateral PE requiring anticoagulation and further workup.    * Bilateral pulmonary embolism (HCC)- (present on admission)  Assessment & Plan  1 week history of shortness of breath with pleuritic chest pain.  Likely provoked given patient's immobility since ankle sprain in May 2025 as well as worsening lower extremity weakness over the past 6 weeks. Troponin 9, NT proBNP 170, D-dimer 6.56. Chest x-ray with hazy right  "lower lobe infiltrates.  CT PE with RML and RLL PE, left lower lobe subsegmental PE, no radiographic sign of right heart strain, right middle lobe consolidation appearance favoring infarct, small layering right pleural effusion, 6.0 mm RLL pulm nodule.  EKG sinus tachycardia , QTc 474 with S1Q3T3, without ST changes.    PESI 88, class III intermediate risk  Vinny 3, stage II intermediate risk    -Telemetry  -Order TTE  -Order b/l LE DVT US  -Enoxaparin 1mg/kg q12h  -Supplemental oxygen as needed  -Wean oxygen as tolerated  -Incentive spirometry  -Multimodal pain management    Weakness of both lower extremities- (present on admission)  Assessment & Plan  Patient with bilateral lower extremity weakness for the past 6 weeks with intermittent bowel and bladder incontinence, denies saddle anesthesia.  Bladder scan showing only 58 mL.  Lumbar spinal tenderness to palpation, with 3/5 strength in bilateral lower extremities.  Per Dr. Mullins's note 01/27/25: \"MRI of the thoracic spine performed at [facility] on 8/24/2024 independently reviewed is essentially an unremarkable MRI of the thoracic spine. MRI of the lumbar spine performed at [facility] on 8/24/2024 independently reviewed demonstrates disc bulges at L1-2 L3-4 without significant central or foraminal stenosis.\"    - Day team to consult spine surgery once MRI performed; followed by Dr. Mullins  - Order MRI thoracic and lumbar spine without contrast  - IV dexamethasone    Pulmonary infarct (HCC)- (present on admission)  Assessment & Plan  CT showing RML consolidation favoring pulmonary infarction.    - Refer to bilateral PE    Pulmonary nodule- (present on admission)  Assessment & Plan  CT showing 6.0 mm RLL pulmonary nodule    - Outpatient follow-up    Normocytic anemia- (present on admission)  Assessment & Plan  Hemoglobin 10.9 with MCV 91.3.  No sign of acute bleed.    - Daily CBC  - Order iron panel, ferritin, reticulocyte count, " TSH/FT4    Elevated liver enzymes- (present on admission)  Assessment & Plan  AST 54, ALT 59, alk phos 86, T. bili 0.4.  Denies abdominal pain    - Daily CMP    Generalized anxiety disorder with panic attacks- (present on admission)  Assessment & Plan  Not on any medications    Mild intermittent asthma without complication- (present on admission)  Assessment & Plan  No sign of exacerbation.    - As needed albuterol    Seizure disorder (HCC)- (present on admission)  Assessment & Plan  - Continue home lamotrigine        VTE prophylaxis: SCDs/TEDs and therapeutic anticoagulation with weight-based enoxaparin    Patient is critically ill.   The patient continues to have: Bilateral pulmonary emboli with pulmonary infarct  The vital organ system that is affected is the: Pulmonary  If untreated there is a high chance of deterioration into: Acute heart failure, flash pulmonary edema, tachyarrhythmia, cardiogenic shock  And eventually death.   The critical care that I am providing today is: Weight-based enoxaparin on telemetry  The critical that has been undertaken is medically complex.   There has been no overlap in critical care time.   Critical Care Time not including procedures: 37 minutes         [1] No Known Allergies

## 2025-07-07 NOTE — ED NOTES
Bedside report received from prior RN. Pt alert. Low O2 on RA, placed on O2, titrated to 4L O2 NC, resp normal/unlabored. Bed side rails up/in low position. Pt updated to POC and all questions answered.     Contacted MRI, pt scheduled for 0830, TAMIE Toney contacted regarding needing order for meds prior to MRI

## 2025-07-07 NOTE — PROGRESS NOTES
4 Eyes Skin Assessment Completed by MORENITA Mccracken and MORENITA Spear.    Skin assessment is primarily focused on high risk bony prominences. Pay special attention to skin beneath and around medical devices, high risk bony prominences, skin to skin areas and areas where the patient lacks sensation to feel pain and areas where the patient previously had breakdown.     Head (Occipital):  WDL   Ears (Under Medical Devices): WDL   Nose (Under Medical Devices): WDL   Mouth:  WDL   Neck: WDL   Breast/Chest:  WDL   Shoulder Blades:  WDL   Spine:   WDL   (R) Arm/Elbow/Hand: Bruising   (L) Arm/Elbow/Hand: WDL   Abdomen: WDL   Pannus/Groin:  WDL   Sacrum/Coccyx:   WDL   (R) Ischial Tuberosity (Sit Bones):  WDL   (L) Ischial Tuberosity (Sit Bones):  WDL   (R) Leg:  WDL   (L) Leg:  WDL   (R) Heel:  WDL   (R) Foot/Toe: Edema   (L) Heel: WDL   (L) Foot/Toe:  Edema       DEVICES IN USE:   Respiratory Devices:  Nasal cannula  Feeding Devices:  N/A   Lines & BP Monitoring Devices:  Peripheral IV    Orthopedic Devices:  N/A  Miscellaneous Devices:  Telemetry monitor    PROTOCOL INTERVENTIONS:   Standard/Trauma Bed:  Already in place    WOUND PHOTOS:   N/A no wounds identified    WOUND CONSULT:   N/A, no advanced wound care needs identified

## 2025-07-07 NOTE — ED TRIAGE NOTES
Chief Complaint   Patient presents with    Shortness of Breath     X1 week on and off    Chest Pain     X1 week on and off, worsening when taking a deep breath or coughing     Other     Pt reporting blood in urine and losing control of bowel and bladder x2 weeks.      Pt to triage via wheelchair for above complaint, accompanied by family. Per family, pt seems more forgetful recently. Pt A&Ox4 speaking in complete sentences, following commands. Reporting 9/10 CP with SOB upon deep inhalation. Denies any recent sickness exposures. Pt with Hx of neuropathy and seizure disorder, takes medications as prescribed, denies any seizure activities. EKG obtained. Pt and family educated on triage process and wait times. Pt and family verbalized understanding.

## 2025-07-07 NOTE — ASSESSMENT & PLAN NOTE
1 week history of shortness of breath with pleuritic chest pain.  Likely provoked given patient's immobility since ankle sprain in May 2025 as well as worsening lower extremity weakness over the past 6 weeks. Troponin 9, NT proBNP 170, D-dimer 6.56. Chest x-ray with hazy right lower lobe infiltrates.  CT PE with RML and RLL PE, left lower lobe subsegmental PE, no radiographic sign of right heart strain, right middle lobe consolidation appearance favoring infarct, small layering right pleural effusion, 6.0 mm RLL pulm nodule.  EKG sinus tachycardia , QTc 474 with S1Q3T3, without ST changes.    PESI 88, class III intermediate risk  Vinny 3, stage II intermediate risk    I reviewed lower extremity ultrasounds, confirming bilateral DVTs.    Continue telemetry  Echo pending  Continue Lovenox subcut every 12 hours for now.  Patient mention he has hematuria and recent hemoptysis.  Will need to see if he tolerates medication before starting him on DOAC.  Home oxygen evaluation done today, patient sits at 90% at the lowest on room air.  This is still concerning he may need oxygen at home.

## 2025-07-07 NOTE — ASSESSMENT & PLAN NOTE
"Patient with bilateral lower extremity weakness for the past 6 weeks with intermittent bowel and bladder incontinence, denies saddle anesthesia.  Bladder scan showing only 58 mL.  Lumbar spinal tenderness to palpation, with 3/5 strength in bilateral lower extremities.  Per Dr. Mullins's note 01/27/25: \"MRI of the thoracic spine performed at [facility] on 8/24/2024 independently reviewed is essentially an unremarkable MRI of the thoracic spine. MRI of the lumbar spine performed at [facility] on 8/24/2024 independently reviewed demonstrates disc bulges at L1-2 L3-4 without significant central or foraminal stenosis.\"    - MRI thoracic and lumbar spine without contrast- are both negative for any acute issues, only showing mild chronic issues.  No need for spine surgery consultation    See interval problem update for detailed information patient's weakness for the last 1 year.  Discontinue IV dexamethasone, there is no spinal impingement.  I recommended for patient to follow-up with his neurology.  He is scheduled to have a EMG outpatient.  Would recommend a lumbar puncture as well.  I ordered labs including connective tissue disease, double-stranded DNA, lead as he is a , myasthenia gravis labs, HIV, syphilis testing, vitamin B12, B6, B1.  "

## 2025-07-07 NOTE — ASSESSMENT & PLAN NOTE
-DVT study reveals 2 acute thrombosis  Acute/subacute occlusive thrombus in the right popliteal and posterior    tibial veins.   2. Acute partially occlusive thrombus in the left proximal femoral vein.    Continue Lovenox bridge  Will need to price check DOAC

## 2025-07-07 NOTE — PROGRESS NOTES
[facility] Medicine Daily Progress Note    Date of Service  7/7/2025    Chief Complaint  Abelino Crocker is a 30 y.o. male admitted 7/6/2025 with shortness of breath, pleuritic chest pain and lower extremity weakness.    Hospital Course  Patient is a 30-year-old male with past medical history of seizure, anxiety, presents with 1 week history of shortness of breath and pleuritic chest pain, also with 6-week history of lower extremity weakness with bowel and bladder incontinence.     Patient recently seen at [facility] emergency department on 5/17/2025 for right foot pain after fall.  Right foot x-ray at that time without acute abnormality.     Patient states that after their fall and ankle sprain on 5/17/2025 they have been relatively immobile.  Patient states that patient states that over the past week they have been having shortness of breath with pleuritic chest pain.  States that approximately 6 weeks ago they started having lower extremity weakness with intermittent bowel and bladder incontinence.  Denies saddle anesthesia.     In the ED, BP 90s to 130s/50s to 70s, HR 90s to 110s, RR 16-20, afebrile, saturating well on room air.  Received cyclobenzaprine 10 mg p.o. x 1, albuterol inhaler x 1, Tylenol 1 g p.o. x 1, lidocaine patch x 1, enoxaparin 100 mg IV x 1.  WBC 7.6, hemoglobin 10.9, , sodium 140, potassium 3.8, bicarb 22, anion gap 14, BUN 5, creatinine 0.76, AST 54, ALT 59, alk phos 86, T. bili 0.4, troponin 9, NT proBNP 170, D-dimer 6.56.  UA unremarkable.  Right ankle x-ray without acute abnormality.  Right foot x-ray without acute abnormality.  Chest x-ray with hazy right lower lobe infiltrates.  CT PE with RML and RLL PE, left lower lobe subsegmental PE, no radiographic sign of right heart strain, right middle lobe consolidation appearance favoring infarct, small layering right pleural effusion, 6.0 mm RLL pulm nodule.  EKG sinus tachycardia , QTc 474 with S1Q3T3, without ST  changes.    Interval Problem Update  7/7 afebrile, vitals are stable and on room air.  Patient is complaining of chest/shoulder pain, increased pain meds.  He also complains of chronic neuropathy down and throughout both legs.  MRI of thoracic and lumber spine is negative for any acute issues, only showing mild chronic issues, no need for spine surgery consultation inpatient.  DVT study does show bilateral thrombosis.  Continue lovenox 100mg BID and monitor for hematuria.  Checking urinalysis.     I have discussed this patient's plan of care and discharge plan at IDT rounds today with Case Management, Nursing, Nursing leadership, and other members of the IDT team.    Consultants/Specialty  None    Code Status  Full Code    Disposition  The patient is not medically cleared for discharge to home or a post-acute facility.  Anticipate discharge to: home with close outpatient follow-up    I have placed the appropriate orders for post-discharge needs.    Review of Systems  Review of Systems   Constitutional:  Negative for chills, fever and malaise/fatigue.   Respiratory:  Positive for shortness of breath. Negative for cough.    Cardiovascular:  Positive for chest pain. Negative for palpitations and leg swelling.   Gastrointestinal:  Negative for abdominal pain, diarrhea, heartburn, nausea and vomiting.        Incontinence of bowel   Genitourinary:  Positive for hematuria. Negative for dysuria, frequency and urgency.        Incontinence of bladder   Neurological:  Positive for tingling and focal weakness. Negative for dizziness and headaches.   All other systems reviewed and are negative.       Physical Exam  Temp:  [37 °C (98.6 °F)] 37 °C (98.6 °F)  Pulse:  [] 94  Resp:  [14-20] 18  BP: ()/(52-78) 116/58  SpO2:  [87 %-95 %] 92 %    Physical Exam  Vitals and nursing note reviewed.   Constitutional:       Appearance: Normal appearance. He is not ill-appearing.   HENT:      Head: Normocephalic and atraumatic.       Jaw: There is normal jaw occlusion.      Right Ear: Hearing normal.      Left Ear: Hearing normal.      Nose: Nose normal.      Mouth/Throat:      Lips: Pink.      Mouth: Mucous membranes are moist.   Eyes:      Extraocular Movements: Extraocular movements intact.      Conjunctiva/sclera: Conjunctivae normal.      Pupils: Pupils are equal, round, and reactive to light.   Neck:      Vascular: No carotid bruit.   Cardiovascular:      Rate and Rhythm: Normal rate and regular rhythm.      Pulses: Normal pulses.      Heart sounds: Normal heart sounds, S1 normal and S2 normal.   Pulmonary:      Effort: Pulmonary effort is normal.      Breath sounds: Normal breath sounds and air entry. No stridor.   Musculoskeletal:      Cervical back: Normal range of motion and neck supple.      Right lower le+ Edema present.      Left lower le+ Edema present.   Skin:     General: Skin is warm and dry.      Capillary Refill: Capillary refill takes less than 2 seconds.   Neurological:      General: No focal deficit present.      Mental Status: He is alert and oriented to person, place, and time. Mental status is at baseline.      Sensory: Sensation is intact.      Motor: Motor function is intact.   Psychiatric:         Attention and Perception: Attention and perception normal.         Mood and Affect: Mood and affect normal.         Speech: Speech normal.         Behavior: Behavior normal. Behavior is cooperative.         Fluids  No intake or output data in the 24 hours ending 25 1216     Laboratory  Recent Labs     25  0058   WBC 7.6   RBC 3.69*   HEMOGLOBIN 10.9*   HEMATOCRIT 33.7*   MCV 91.3   MCH 29.5   MCHC 32.3   RDW 56.4*   PLATELETCT 361   MPV 9.9     Recent Labs     25  0058   SODIUM 140   POTASSIUM 3.8   CHLORIDE 104   CO2 22   GLUCOSE 87   BUN 5*   CREATININE 0.76   CALCIUM 9.0                   Imaging  MR-LUMBAR SPINE-WITH & W/O   Final Result      1.  Mild lumbar spondylosis.   2.  No significant  spinal stenosis or nerve compression.   3.  No abnormal enhancement.            MR-THORACIC SPINE-WITH & W/O   Final Result      1.  Multiple chronic mild superior endplate compression and Schmorl's node deformities.   2.  No significant disc herniation, spinal or foraminal stenosis.   3.  No abnormal thoracic cord signal or enhancement      US-EXTREMITY VENOUS LOWER BILAT   Final Result      CT-CTA CHEST PULMONARY ARTERY W/ RECONS   Final Result         1.  Right middle lobe and right lower lobe pulmonary emboli.   2.  Left lower lobe subsegmental pulmonary emboli.   3.  No radiographic findings right heart strain.   4.  Right middle lobe consolidation, appearance favoring pulmonary infarct.   5.  Small layering right pleural effusion.   6.  Pulmonary nodule, see nodule follow-up recommendations below.      Fleischner Society pulmonary nodule recommendations:   Low Risk: CT at 6-12 months, then consider CT at 18-24 months      High Risk: CT at 6-12 months, then CT at 18-24 months      Low Risk - Minimal or absent history of smoking and of other known risk factors.      High Risk - History of smoking or of other known risk factors.      Note: These recommendations do not apply to lung cancer screening, patients with immunosuppression, or patients with known primary cancer.      Fleischner Society 2017 Guidelines for Management of Incidentally Detected Pulmonary Nodules in Adults      These findings were discussed with the patient's clinician, Stephane Snyder, on 7/7/2025 4:12 AM.      DX-ANKLE 2- VIEWS RIGHT   Final Result         1.  No radiographic evidence of acute traumatic injury.      DX-FOOT-2- RIGHT   Final Result         1.  No acute traumatic bony injury.      DX-CHEST-PORTABLE (1 VIEW)   Final Result         1.  Hazy right lower lobe infiltrates.      EC-ECHOCARDIOGRAM COMPLETE W/O CONT    (Results Pending)        Assessment/Plan  * Bilateral pulmonary embolism (HCC)- (present on admission)  Assessment &  "Plan  1 week history of shortness of breath with pleuritic chest pain.  Likely provoked given patient's immobility since ankle sprain in May 2025 as well as worsening lower extremity weakness over the past 6 weeks. Troponin 9, NT proBNP 170, D-dimer 6.56. Chest x-ray with hazy right lower lobe infiltrates.  CT PE with RML and RLL PE, left lower lobe subsegmental PE, no radiographic sign of right heart strain, right middle lobe consolidation appearance favoring infarct, small layering right pleural effusion, 6.0 mm RLL pulm nodule.  EKG sinus tachycardia , QTc 474 with S1Q3T3, without ST changes.    PESI 88, class III intermediate risk  Vinny 3, stage II intermediate risk    -Telemetry  -Order TTE  -b/l LE DVT US-  reveals 2 acute thrombosis in bilateral legs  -Enoxaparin 1mg/kg q12h  -Supplemental oxygen as needed  -Wean oxygen as tolerated  -Incentive spirometry  -Multimodal pain management    DVT (deep venous thrombosis) ([facility])  Assessment & Plan  -DVT study reveals 2 acute thrombosis  Acute/subacute occlusive thrombus in the right popliteal and posterior    tibial veins.   2. Acute partially occlusive thrombus in the left proximal femoral vein.  Continue lovenox 100 mg BID, monitor for hematuria    Weakness of both lower extremities- (present on admission)  Assessment & Plan  Patient with bilateral lower extremity weakness for the past 6 weeks with intermittent bowel and bladder incontinence, denies saddle anesthesia.  Bladder scan showing only 58 mL.  Lumbar spinal tenderness to palpation, with 3/5 strength in bilateral lower extremities.  Per Dr. Mullins's note 01/27/25: \"MRI of the thoracic spine performed at [facility] on 8/24/2024 independently reviewed is essentially an unremarkable MRI of the thoracic spine. MRI of the lumbar spine performed at [facility] on 8/24/2024 independently reviewed demonstrates disc bulges at L1-2 L3-4 without significant central or foraminal " "stenosis.\"    - MRI thoracic and lumbar spine without contrast- are both negative for any acute issues, only showing mild chronic issues.  No need for spine surgery consultation  - IV dexamethasone    Pulmonary infarct (HCC)- (present on admission)  Assessment & Plan  CT showing RML consolidation favoring pulmonary infarction.    - Refer to bilateral PE    Normocytic anemia- (present on admission)  Assessment & Plan  Hemoglobin 10.9 with MCV 91.3.  No sign of acute bleed.    - Daily CBC  - Order iron panel, ferritin, reticulocyte count, TSH/FT4    Elevated liver enzymes- (present on admission)  Assessment & Plan  AST 54, ALT 59, alk phos 86, T. bili 0.4.  Denies abdominal pain    - Daily CMP    Pulmonary nodule- (present on admission)  Assessment & Plan  CT showing 6.0 mm RLL pulmonary nodule    - Outpatient follow-up    Generalized anxiety disorder with panic attacks- (present on admission)  Assessment & Plan  Not on any medications    Mild intermittent asthma without complication- (present on admission)  Assessment & Plan  No sign of exacerbation.    - As needed albuterol    Seizure disorder (HCC)- (present on admission)  Assessment & Plan  - Continue home lamotrigine         VTE prophylaxis:   SCDs/TEDs   enoxaparin ppx      I have performed a physical exam and reviewed and updated ROS and Plan today (7/7/2025). In review of yesterday's note (7/6/2025), there are no changes except as documented above.        "

## 2025-07-07 NOTE — ED NOTES
Medication history reviewed with patient.  Med rec is complete.  Allergies reviewed.    Patient denies any outpatient anti-MICROBIAL in the last 30 days.    Anticoagulants taken in the last 14 days? No      Jennifer Rosenberg PhT

## 2025-07-07 NOTE — ASSESSMENT & PLAN NOTE
Hemoglobin 10.9 with MCV 91.3.  No sign of acute bleed.  Iron saturation 8%, TIBC 230, ferritin 305  Repeating CBC.  Patient describing recent hematemesis and hematuria.

## 2025-07-08 ENCOUNTER — APPOINTMENT (OUTPATIENT)
Dept: CARDIOLOGY | Facility: MEDICAL CENTER | Age: 30
DRG: 176 | End: 2025-07-08
Payer: COMMERCIAL

## 2025-07-08 LAB
LV EJECT FRACT  99904: 61
LV EJECT FRACT MOD 2C 99903: 62.84
LV EJECT FRACT MOD 4C 99902: 61.04
LV EJECT FRACT MOD BP 99901: 60.97

## 2025-07-08 PROCEDURE — 770020 HCHG ROOM/CARE - TELE (206)

## 2025-07-08 PROCEDURE — 83516 IMMUNOASSAY NONANTIBODY: CPT

## 2025-07-08 PROCEDURE — 700102 HCHG RX REV CODE 250 W/ 637 OVERRIDE(OP)

## 2025-07-08 PROCEDURE — 700102 HCHG RX REV CODE 250 W/ 637 OVERRIDE(OP): Performed by: STUDENT IN AN ORGANIZED HEALTH CARE EDUCATION/TRAINING PROGRAM

## 2025-07-08 PROCEDURE — 86225 DNA ANTIBODY NATIVE: CPT

## 2025-07-08 PROCEDURE — 700111 HCHG RX REV CODE 636 W/ 250 OVERRIDE (IP): Performed by: STUDENT IN AN ORGANIZED HEALTH CARE EDUCATION/TRAINING PROGRAM

## 2025-07-08 PROCEDURE — 93306 TTE W/DOPPLER COMPLETE: CPT | Mod: 26 | Performed by: INTERNAL MEDICINE

## 2025-07-08 PROCEDURE — A9270 NON-COVERED ITEM OR SERVICE: HCPCS | Performed by: STUDENT IN AN ORGANIZED HEALTH CARE EDUCATION/TRAINING PROGRAM

## 2025-07-08 PROCEDURE — 86235 NUCLEAR ANTIGEN ANTIBODY: CPT | Mod: 91

## 2025-07-08 PROCEDURE — 99233 SBSQ HOSP IP/OBS HIGH 50: CPT | Performed by: INTERNAL MEDICINE

## 2025-07-08 PROCEDURE — A9270 NON-COVERED ITEM OR SERVICE: HCPCS

## 2025-07-08 PROCEDURE — 93306 TTE W/DOPPLER COMPLETE: CPT

## 2025-07-08 RX ADMIN — ENOXAPARIN SODIUM 100 MG: 100 INJECTION SUBCUTANEOUS at 18:00

## 2025-07-08 RX ADMIN — OXYCODONE HYDROCHLORIDE 10 MG: 10 TABLET ORAL at 00:51

## 2025-07-08 RX ADMIN — DEXAMETHASONE SODIUM PHOSPHATE 4 MG: 4 INJECTION, SOLUTION INTRAMUSCULAR; INTRAVENOUS at 05:02

## 2025-07-08 RX ADMIN — DEXAMETHASONE SODIUM PHOSPHATE 4 MG: 4 INJECTION, SOLUTION INTRAMUSCULAR; INTRAVENOUS at 18:00

## 2025-07-08 RX ADMIN — DEXAMETHASONE SODIUM PHOSPHATE 4 MG: 4 INJECTION, SOLUTION INTRAMUSCULAR; INTRAVENOUS at 12:57

## 2025-07-08 RX ADMIN — OXYCODONE HYDROCHLORIDE 10 MG: 10 TABLET ORAL at 16:35

## 2025-07-08 RX ADMIN — ACETAMINOPHEN 1000 MG: 500 TABLET ORAL at 07:48

## 2025-07-08 RX ADMIN — OXYCODONE HYDROCHLORIDE 10 MG: 10 TABLET ORAL at 05:04

## 2025-07-08 RX ADMIN — LAMOTRIGINE 100 MG: 100 TABLET ORAL at 05:02

## 2025-07-08 RX ADMIN — ACETAMINOPHEN 1000 MG: 500 TABLET ORAL at 14:17

## 2025-07-08 RX ADMIN — ACETAMINOPHEN 1000 MG: 500 TABLET ORAL at 00:46

## 2025-07-08 RX ADMIN — DEXAMETHASONE SODIUM PHOSPHATE 4 MG: 4 INJECTION, SOLUTION INTRAMUSCULAR; INTRAVENOUS at 00:46

## 2025-07-08 RX ADMIN — ENOXAPARIN SODIUM 100 MG: 100 INJECTION SUBCUTANEOUS at 05:02

## 2025-07-08 ASSESSMENT — COGNITIVE AND FUNCTIONAL STATUS - GENERAL
TOILETING: A LITTLE
WALKING IN HOSPITAL ROOM: A LOT
DAILY ACTIVITIY SCORE: 18
MOVING FROM LYING ON BACK TO SITTING ON SIDE OF FLAT BED: A LITTLE
CLIMB 3 TO 5 STEPS WITH RAILING: A LOT
MOVING TO AND FROM BED TO CHAIR: A LOT
DRESSING REGULAR LOWER BODY CLOTHING: A LITTLE
STANDING UP FROM CHAIR USING ARMS: A LOT
PERSONAL GROOMING: A LITTLE
SUGGESTED CMS G CODE MODIFIER DAILY ACTIVITY: CK
HELP NEEDED FOR BATHING: A LOT
SUGGESTED CMS G CODE MODIFIER MOBILITY: CL
DRESSING REGULAR UPPER BODY CLOTHING: A LITTLE
MOBILITY SCORE: 14
TURNING FROM BACK TO SIDE WHILE IN FLAT BAD: A LITTLE

## 2025-07-08 ASSESSMENT — ENCOUNTER SYMPTOMS
FOCAL WEAKNESS: 1
SHORTNESS OF BREATH: 1
WEAKNESS: 1

## 2025-07-08 ASSESSMENT — PAIN DESCRIPTION - PAIN TYPE
TYPE: ACUTE PAIN

## 2025-07-08 NOTE — PROGRESS NOTES
[facility] Medicine Daily Progress Note    Date of Service  7/8/2025    Chief Complaint  Abelino Crocker is a 30 y.o. male admitted 7/6/2025 with   Chief Complaint   Patient presents with    Shortness of Breath     X1 week on and off    Chest Pain     X1 week on and off, worsening when taking a deep breath or coughing     Other     Pt reporting blood in urine and losing control of bowel and bladder x2 weeks.        Hospital Course  Patient is a 30-year-old male with past medical history of seizure, anxiety, presents with 1 week history of shortness of breath and pleuritic chest pain, also with 6-week history of lower extremity weakness with bowel and bladder incontinence.     Patient recently seen at [facility] emergency department on 5/17/2025 for right foot pain after fall.  Right foot x-ray at that time without acute abnormality.     Patient states that after their fall and ankle sprain on 5/17/2025 they have been relatively immobile.  Patient states that patient states that over the past week they have been having shortness of breath with pleuritic chest pain.  States that approximately 6 weeks ago they started having lower extremity weakness with intermittent bowel and bladder incontinence.  Denies saddle anesthesia.     In the ED, BP 90s to 130s/50s to 70s, HR 90s to 110s, RR 16-20, afebrile, saturating well on room air.  Received cyclobenzaprine 10 mg p.o. x 1, albuterol inhaler x 1, Tylenol 1 g p.o. x 1, lidocaine patch x 1, enoxaparin 100 mg IV x 1.  WBC 7.6, hemoglobin 10.9, , sodium 140, potassium 3.8, bicarb 22, anion gap 14, BUN 5, creatinine 0.76, AST 54, ALT 59, alk phos 86, T. bili 0.4, troponin 9, NT proBNP 170, D-dimer 6.56.  UA unremarkable.  Right ankle x-ray without acute abnormality.  Right foot x-ray without acute abnormality.  Chest x-ray with hazy right lower lobe infiltrates.  CT PE with RML and RLL PE, left lower lobe subsegmental PE, no radiographic sign of right heart  strain, right middle lobe consolidation appearance favoring infarct, small layering right pleural effusion, 6.0 mm RLL pulm nodule.  EKG sinus tachycardia , QTc 474 with S1Q3T3, without ST changes.    Interval Problem Update    Pt reports drinking 2-4 beer per night for the last year, was only on weekends before that. He reports being sober for 8 months now.   He reports he vomited a copious amount of blood 2 weeks ago. He uses Ibuprofen 800mg twice daily for months now.   He reported he had hematuria before coming into the hospital.    Leg weakness:  He had only one sexual partner before, his wife, passed away in 2023. He could not give a confident answer on fidelity.  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  He works as a .  He is following with an outpatient neurologist, he has an appt next week to get an EMG.  His mother showed a video of his left leg having random fasiculations. He is hypersensitive even as I take of his socks for him.  He denied any pet problems, fleas/tics, no travel, no drugs, no known STI, no huffing paint/Nitric oxide.    PE:  I had extensive conversation with patient, fiance and mother about risks and benefits of anticoagulation, including risks of stroke without prophylaxis, risk of ICH/GIB/epistaxis/bleeding with use. I discussed different medications including Xarelto/Eliquis/Warfarin. I explained warfarin INR checks and range goals 2-3, and risks compared to DOACs.       I have discussed this patient's plan of care and discharge plan at IDT rounds today with Case Management, Nursing, Nursing leadership, and other members of the IDT team.    Consultants/Specialty  none    Code Status  Full Code    Disposition  The patient is not medically cleared for discharge to home or a post-acute facility.      I have placed the appropriate orders for post-discharge needs.    Review of Systems  Review of  Systems   Constitutional:  Positive for malaise/fatigue.   Respiratory:  Positive for shortness of breath.    Neurological:  Positive for focal weakness and weakness.        Walking difficulties   All other systems reviewed and are negative.       Physical Exam  Temp:  [36.3 °C (97.3 °F)-36.7 °C (98.1 °F)] 36.6 °C (97.9 °F)  Pulse:  [61-84] 63  Resp:  [17-18] 17  BP: ()/(54-73) 98/58  SpO2:  [90 %-97 %] 95 %    Physical Exam  Vitals and nursing note reviewed.   Constitutional:       General: He is not in acute distress.     Appearance: He is obese. He is not diaphoretic.   HENT:      Mouth/Throat:      Mouth: Mucous membranes are dry.      Pharynx: No oropharyngeal exudate.   Cardiovascular:      Rate and Rhythm: Normal rate and regular rhythm.      Pulses: Normal pulses.      Heart sounds: Normal heart sounds. No murmur heard.     Comments: Difficult to auscultate due to body habitus, distant heart sounds  Pulmonary:      Effort: Pulmonary effort is normal. No respiratory distress.      Breath sounds: Normal breath sounds. No wheezing or rales.      Comments: Difficult to auscultate due to body habitus  Abdominal:      General: Bowel sounds are normal. There is no distension.      Tenderness: There is no abdominal tenderness.   Musculoskeletal:         General: No swelling or tenderness. Normal range of motion.      Right lower leg: Edema present.      Left lower leg: Edema present.   Skin:     General: Skin is warm.      Capillary Refill: Capillary refill takes less than 2 seconds.      Coloration: Skin is not jaundiced or pale.   Neurological:      General: No focal deficit present.      Mental Status: He is alert and oriented to person, place, and time. Mental status is at baseline.      Motor: Weakness present.   Psychiatric:         Mood and Affect: Mood normal.         Behavior: Behavior normal.         Thought Content: Thought content normal.         Judgment: Judgment normal.          Fluids    Intake/Output Summary (Last 24 hours) at 7/8/2025 2013  Last data filed at 7/8/2025 1545  Gross per 24 hour   Intake 600 ml   Output 300 ml   Net 300 ml        Laboratory  Recent Labs     07/07/25  0058   WBC 7.6   RBC 3.69*   HEMOGLOBIN 10.9*   HEMATOCRIT 33.7*   MCV 91.3   MCH 29.5   MCHC 32.3   RDW 56.4*   PLATELETCT 361   MPV 9.9     Recent Labs     07/07/25  0058   SODIUM 140   POTASSIUM 3.8   CHLORIDE 104   CO2 22   GLUCOSE 87   BUN 5*   CREATININE 0.76   CALCIUM 9.0                   Imaging  MR-LUMBAR SPINE-WITH & W/O   Final Result      1.  Mild lumbar spondylosis.   2.  No significant spinal stenosis or nerve compression.   3.  No abnormal enhancement.            MR-THORACIC SPINE-WITH & W/O   Final Result      1.  Multiple chronic mild superior endplate compression and Schmorl's node deformities.   2.  No significant disc herniation, spinal or foraminal stenosis.   3.  No abnormal thoracic cord signal or enhancement      US-EXTREMITY VENOUS LOWER BILAT   Final Result      CT-CTA CHEST PULMONARY ARTERY W/ RECONS   Final Result         1.  Right middle lobe and right lower lobe pulmonary emboli.   2.  Left lower lobe subsegmental pulmonary emboli.   3.  No radiographic findings right heart strain.   4.  Right middle lobe consolidation, appearance favoring pulmonary infarct.   5.  Small layering right pleural effusion.   6.  Pulmonary nodule, see nodule follow-up recommendations below.      Fleischner Society pulmonary nodule recommendations:   Low Risk: CT at 6-12 months, then consider CT at 18-24 months      High Risk: CT at 6-12 months, then CT at 18-24 months      Low Risk - Minimal or absent history of smoking and of other known risk factors.      High Risk - History of smoking or of other known risk factors.      Note: These recommendations do not apply to lung cancer screening, patients with immunosuppression, or patients with known primary cancer.      Fleischner Society 2017  Guidelines for Management of Incidentally Detected Pulmonary Nodules in Adults      These findings were discussed with the patient's clinician, Stephane Snyder, on 7/7/2025 4:12 AM.      DX-ANKLE 2- VIEWS RIGHT   Final Result         1.  No radiographic evidence of acute traumatic injury.      DX-FOOT-2- RIGHT   Final Result         1.  No acute traumatic bony injury.      DX-CHEST-PORTABLE (1 VIEW)   Final Result         1.  Hazy right lower lobe infiltrates.      EC-ECHOCARDIOGRAM COMPLETE W/O CONT    (Results Pending)        Assessment/Plan  * Bilateral pulmonary embolism (HCC)- (present on admission)  Assessment & Plan  1 week history of shortness of breath with pleuritic chest pain.  Likely provoked given patient's immobility since ankle sprain in May 2025 as well as worsening lower extremity weakness over the past 6 weeks. Troponin 9, NT proBNP 170, D-dimer 6.56. Chest x-ray with hazy right lower lobe infiltrates.  CT PE with RML and RLL PE, left lower lobe subsegmental PE, no radiographic sign of right heart strain, right middle lobe consolidation appearance favoring infarct, small layering right pleural effusion, 6.0 mm RLL pulm nodule.  EKG sinus tachycardia , QTc 474 with S1Q3T3, without ST changes.    PESI 88, class III intermediate risk  Vinny 3, stage II intermediate risk    I reviewed lower extremity ultrasounds, confirming bilateral DVTs.    Continue telemetry  Echo pending  Continue Lovenox subcut every 12 hours for now.  Patient mention he has hematuria and recent hemoptysis.  Will need to see if he tolerates medication before starting him on DOAC.  Home oxygen evaluation done today, patient sits at 90% at the lowest on room air.  This is still concerning he may need oxygen at home.    DVT (deep venous thrombosis) (HCC)- (present on admission)  Assessment & Plan  -DVT study reveals 2 acute thrombosis  Acute/subacute occlusive thrombus in the right popliteal and posterior    tibial veins.   2.  "Acute partially occlusive thrombus in the left proximal femoral vein.    Continue Lovenox bridge  Will need to price check DOAC    Weakness of both lower extremities- (present on admission)  Assessment & Plan  Patient with bilateral lower extremity weakness for the past 6 weeks with intermittent bowel and bladder incontinence, denies saddle anesthesia.  Bladder scan showing only 58 mL.  Lumbar spinal tenderness to palpation, with 3/5 strength in bilateral lower extremities.  Per Dr. Mullins's note 01/27/25: \"MRI of the thoracic spine performed at [facility] on 8/24/2024 independently reviewed is essentially an unremarkable MRI of the thoracic spine. MRI of the lumbar spine performed at [facility] on 8/24/2024 independently reviewed demonstrates disc bulges at L1-2 L3-4 without significant central or foraminal stenosis.\"    - MRI thoracic and lumbar spine without contrast- are both negative for any acute issues, only showing mild chronic issues.  No need for spine surgery consultation    See interval problem update for detailed information patient's weakness for the last 1 year.  Discontinue IV dexamethasone, there is no spinal impingement.  I recommended for patient to follow-up with his neurology.  He is scheduled to have a EMG outpatient.  Would recommend a lumbar puncture as well.  I ordered labs including connective tissue disease, double-stranded DNA, lead as he is a , myasthenia gravis labs, HIV, syphilis testing, vitamin B12, B6, B1.    Seizure disorder (HCC)- (present on admission)  Assessment & Plan  Continue lamotrigine    Pulmonary nodule- (present on admission)  Assessment & Plan  CT showing 6.0 mm RLL pulmonary nodule  Pulmonology referral outpatient provided    Normocytic anemia- (present on admission)  Assessment & Plan  Hemoglobin 10.9 with MCV 91.3.  No sign of acute bleed.  Iron saturation 8%, TIBC 230, ferritin 305  Repeating CBC.  Patient describing recent hematemesis " and hematuria.    Elevated liver enzymes- (present on admission)  Assessment & Plan  AST 54, ALT 59, alk phos 86, T. bili 0.4.  Denies abdominal pain  I ordered hepatic function panel tomorrow    Pulmonary infarct (HCC)- (present on admission)  Assessment & Plan  CT showing RML consolidation favoring pulmonary infarction.  Due to PE  Referral to pulmonology ordered    Generalized anxiety disorder with panic attacks- (present on admission)  Assessment & Plan  Not on any medications  Patient lost his wife back in 2023.  He is a single father.  He does have a fiancé now.  Appears she does have good social support.    Mild intermittent asthma without complication- (present on admission)  Assessment & Plan  No sign of exacerbation.  As needed albuterol         VTE prophylaxis:    therapeutic anticoagulation with weight-based lovenox BID, 1 mg/kg      I have performed a physical exam and reviewed and updated ROS and Plan today (7/8/2025). In review of yesterday's note (7/7/2025), there are no changes except as documented above.      Total time spent 51 minutes. I spent greater than 50% of the time for patient care, including unit/floor time, multiple face-to-face encounters with the patient, counseling on treatment plan and discussion with bedside RN.  Further, I spent time on my own review of patient's overnight events, RN notes, imaging and lab analysis, and developing my assessment and plan above.  In addition, working with , social workers, and Charge RN on case coordination on this date.    This note was generated using voice recognition software which has a chance of producing errors of grammar and content.  I have made every reasonable attempt to find and correct any errors, but it should be expected that some may not be found prior to finalization of this note.

## 2025-07-08 NOTE — PROGRESS NOTES
Monitor summary: SB/SR 43-91, PA 0.16, QRS 0.05, QT 0.40, with occasional PAC's per strip from monitor room.

## 2025-07-08 NOTE — CARE PLAN
The patient is Stable - Low risk of patient condition declining or worsening    Shift Goals  Clinical Goals: respiratory monitoring and safety  Patient Goals: comfort  Family Goals: updates    Progress made toward(s) clinical / shift goals:    Problem: Respiratory  Goal: Patient will achieve/maintain optimum respiratory ventilation and gas exchange  Outcome: Progressing  Note: Patient on 4 NC in order to support proper oxygenation for gas exchange.      Problem: Pain - Standard  Goal: Alleviation of pain or a reduction in pain to the patient’s comfort goal  Outcome: Progressing  Note: Patient medicated per MAR.      Problem: Fall Risk  Goal: Patient will remain free from falls  Outcome: Progressing  Note: Fall precautions in place. Bed locked and in the lowest position. Call light in reach. Frame alarm in use.        Patient is not progressing towards the following goals: N/A

## 2025-07-08 NOTE — DISCHARGE PLANNING
-1515  Per LMSW, DPA sent [facility] SNF referrals.

## 2025-07-08 NOTE — PROGRESS NOTES
Monitor summary: SR 65-97, ND -0.14, QRS -0.10, QT -0.34, with rare PACs per strip from the monitor room.

## 2025-07-09 ENCOUNTER — PHARMACY VISIT (OUTPATIENT)
Dept: PHARMACY | Facility: MEDICAL CENTER | Age: 30
End: 2025-07-09
Payer: COMMERCIAL

## 2025-07-09 VITALS
WEIGHT: 204.15 LBS | HEART RATE: 60 BPM | RESPIRATION RATE: 18 BRPM | OXYGEN SATURATION: 93 % | HEIGHT: 66 IN | SYSTOLIC BLOOD PRESSURE: 91 MMHG | DIASTOLIC BLOOD PRESSURE: 49 MMHG | BODY MASS INDEX: 32.81 KG/M2 | TEMPERATURE: 97.7 F

## 2025-07-09 PROBLEM — E55.9 VITAMIN D DEFICIENCY: Status: ACTIVE | Noted: 2025-07-09

## 2025-07-09 PROBLEM — E53.8 B12 DEFICIENCY: Status: ACTIVE | Noted: 2025-07-09

## 2025-07-09 LAB
25(OH)D3 SERPL-MCNC: 7 NG/ML (ref 30–100)
ALBUMIN SERPL BCP-MCNC: 3.8 G/DL (ref 3.2–4.9)
ALP SERPL-CCNC: 117 U/L (ref 30–99)
ALT SERPL-CCNC: 66 U/L (ref 2–50)
ANION GAP SERPL CALC-SCNC: 13 MMOL/L (ref 7–16)
AST SERPL-CCNC: 50 U/L (ref 12–45)
BILIRUB CONJ SERPL-MCNC: <0.2 MG/DL (ref 0.1–0.5)
BILIRUB INDIRECT SERPL-MCNC: ABNORMAL MG/DL (ref 0–1)
BILIRUB SERPL-MCNC: <0.2 MG/DL (ref 0.1–1.5)
BUN SERPL-MCNC: 15 MG/DL (ref 8–22)
CALCIUM ALBUM COR SERPL-MCNC: 9.1 MG/DL (ref 8.5–10.5)
CALCIUM SERPL-MCNC: 8.9 MG/DL (ref 8.5–10.5)
CHLORIDE SERPL-SCNC: 111 MMOL/L (ref 96–112)
CO2 SERPL-SCNC: 23 MMOL/L (ref 20–33)
CREAT SERPL-MCNC: 0.84 MG/DL (ref 0.5–1.4)
ERYTHROCYTE [DISTWIDTH] IN BLOOD BY AUTOMATED COUNT: 59.6 FL (ref 35.9–50)
GFR SERPLBLD CREATININE-BSD FMLA CKD-EPI: 122 ML/MIN/1.73 M 2
GLUCOSE SERPL-MCNC: 115 MG/DL (ref 65–99)
HAV IGM SERPL QL IA: NORMAL
HBV CORE IGM SER QL: NORMAL
HBV SURFACE AG SER QL: NORMAL
HCT VFR BLD AUTO: 33 % (ref 42–52)
HCV AB SER QL: NORMAL
HGB BLD-MCNC: 10.3 G/DL (ref 14–18)
HIV 1+2 AB+HIV1 P24 AG SERPL QL IA: NORMAL
MAGNESIUM SERPL-MCNC: 2.4 MG/DL (ref 1.5–2.5)
MCH RBC QN AUTO: 29.3 PG (ref 27–33)
MCHC RBC AUTO-ENTMCNC: 31.2 G/DL (ref 32.3–36.5)
MCV RBC AUTO: 94 FL (ref 81.4–97.8)
PHOSPHATE SERPL-MCNC: 4 MG/DL (ref 2.5–4.5)
PLATELET # BLD AUTO: 454 K/UL (ref 164–446)
PMV BLD AUTO: 9.2 FL (ref 9–12.9)
POTASSIUM SERPL-SCNC: 4.2 MMOL/L (ref 3.6–5.5)
PROT SERPL-MCNC: 6.7 G/DL (ref 6–8.2)
RBC # BLD AUTO: 3.51 M/UL (ref 4.7–6.1)
SODIUM SERPL-SCNC: 147 MMOL/L (ref 135–145)
T PALLIDUM AB SER QL IA: NORMAL
VIT B12 SERPL-MCNC: 305 PG/ML (ref 211–911)
WBC # BLD AUTO: 11.6 K/UL (ref 4.8–10.8)

## 2025-07-09 PROCEDURE — 700102 HCHG RX REV CODE 250 W/ 637 OVERRIDE(OP)

## 2025-07-09 PROCEDURE — 86366 MUSCLE-SPECIFIC KINASE ANTB: CPT

## 2025-07-09 PROCEDURE — 85027 COMPLETE CBC AUTOMATED: CPT

## 2025-07-09 PROCEDURE — 80069 RENAL FUNCTION PANEL: CPT

## 2025-07-09 PROCEDURE — 86041 ACETYLCHOLN RCPTR BNDNG ANTB: CPT

## 2025-07-09 PROCEDURE — 83735 ASSAY OF MAGNESIUM: CPT

## 2025-07-09 PROCEDURE — 700102 HCHG RX REV CODE 250 W/ 637 OVERRIDE(OP): Performed by: INTERNAL MEDICINE

## 2025-07-09 PROCEDURE — 700111 HCHG RX REV CODE 636 W/ 250 OVERRIDE (IP): Performed by: STUDENT IN AN ORGANIZED HEALTH CARE EDUCATION/TRAINING PROGRAM

## 2025-07-09 PROCEDURE — 82607 VITAMIN B-12: CPT

## 2025-07-09 PROCEDURE — 97165 OT EVAL LOW COMPLEX 30 MIN: CPT

## 2025-07-09 PROCEDURE — 80074 ACUTE HEPATITIS PANEL: CPT

## 2025-07-09 PROCEDURE — G0475 HIV COMBINATION ASSAY: HCPCS

## 2025-07-09 PROCEDURE — 86780 TREPONEMA PALLIDUM: CPT

## 2025-07-09 PROCEDURE — A9270 NON-COVERED ITEM OR SERVICE: HCPCS | Performed by: STUDENT IN AN ORGANIZED HEALTH CARE EDUCATION/TRAINING PROGRAM

## 2025-07-09 PROCEDURE — 84450 TRANSFERASE (AST) (SGOT): CPT

## 2025-07-09 PROCEDURE — 86042 ACETYLCHOLN RCPTR BLCKG ANTB: CPT

## 2025-07-09 PROCEDURE — 82306 VITAMIN D 25 HYDROXY: CPT

## 2025-07-09 PROCEDURE — RXMED WILLOW AMBULATORY MEDICATION CHARGE: Performed by: INTERNAL MEDICINE

## 2025-07-09 PROCEDURE — 84460 ALANINE AMINO (ALT) (SGPT): CPT

## 2025-07-09 PROCEDURE — 700102 HCHG RX REV CODE 250 W/ 637 OVERRIDE(OP): Performed by: STUDENT IN AN ORGANIZED HEALTH CARE EDUCATION/TRAINING PROGRAM

## 2025-07-09 PROCEDURE — 700111 HCHG RX REV CODE 636 W/ 250 OVERRIDE (IP): Performed by: INTERNAL MEDICINE

## 2025-07-09 PROCEDURE — A9270 NON-COVERED ITEM OR SERVICE: HCPCS

## 2025-07-09 PROCEDURE — 84155 ASSAY OF PROTEIN SERUM: CPT

## 2025-07-09 PROCEDURE — 82247 BILIRUBIN TOTAL: CPT

## 2025-07-09 PROCEDURE — 82248 BILIRUBIN DIRECT: CPT

## 2025-07-09 PROCEDURE — 97535 SELF CARE MNGMENT TRAINING: CPT

## 2025-07-09 PROCEDURE — 83655 ASSAY OF LEAD: CPT

## 2025-07-09 PROCEDURE — 84425 ASSAY OF VITAMIN B-1: CPT

## 2025-07-09 PROCEDURE — 84207 ASSAY OF VITAMIN B-6: CPT

## 2025-07-09 PROCEDURE — 97162 PT EVAL MOD COMPLEX 30 MIN: CPT

## 2025-07-09 PROCEDURE — A9270 NON-COVERED ITEM OR SERVICE: HCPCS | Performed by: INTERNAL MEDICINE

## 2025-07-09 PROCEDURE — 84075 ASSAY ALKALINE PHOSPHATASE: CPT

## 2025-07-09 RX ORDER — ERGOCALCIFEROL 1.25 MG/1
50000 CAPSULE, LIQUID FILLED ORAL
Qty: 7 CAPSULE | Refills: 0 | Status: SHIPPED | OUTPATIENT
Start: 2025-07-16 | End: 2025-08-28

## 2025-07-09 RX ORDER — ERGOCALCIFEROL 1.25 MG/1
50000 CAPSULE, LIQUID FILLED ORAL
Status: DISCONTINUED | OUTPATIENT
Start: 2025-07-09 | End: 2025-07-09 | Stop reason: HOSPADM

## 2025-07-09 RX ORDER — ALBUTEROL SULFATE 90 UG/1
2 INHALANT RESPIRATORY (INHALATION) EVERY 6 HOURS PRN
Qty: 18 G | Refills: 1 | Status: SHIPPED | OUTPATIENT
Start: 2025-07-09

## 2025-07-09 RX ORDER — CYANOCOBALAMIN 1000 UG/ML
1000 INJECTION, SOLUTION INTRAMUSCULAR; SUBCUTANEOUS
Status: DISCONTINUED | OUTPATIENT
Start: 2025-07-09 | End: 2025-07-09 | Stop reason: HOSPADM

## 2025-07-09 RX ORDER — MULTIVITAMIN WITH IRON
1000 TABLET ORAL DAILY
Status: DISCONTINUED | OUTPATIENT
Start: 2025-07-09 | End: 2025-07-09 | Stop reason: HOSPADM

## 2025-07-09 RX ORDER — LANOLIN ALCOHOL/MO/W.PET/CERES
100 CREAM (GRAM) TOPICAL DAILY
Qty: 30 TABLET | Refills: 1 | Status: SHIPPED | OUTPATIENT
Start: 2025-07-09 | End: 2025-08-08

## 2025-07-09 RX ORDER — OXYCODONE HYDROCHLORIDE 5 MG/1
5 TABLET ORAL EVERY 8 HOURS PRN
Qty: 15 TABLET | Refills: 0 | Status: SHIPPED | OUTPATIENT
Start: 2025-07-09 | End: 2025-07-14

## 2025-07-09 RX ADMIN — CYANOCOBALAMIN 1000 MCG: 1000 INJECTION, SOLUTION INTRAMUSCULAR; SUBCUTANEOUS at 10:29

## 2025-07-09 RX ADMIN — ACETAMINOPHEN 1000 MG: 500 TABLET ORAL at 09:03

## 2025-07-09 RX ADMIN — CYANOCOBALAMIN TAB 500 MCG 1000 MCG: 500 TAB at 09:04

## 2025-07-09 RX ADMIN — ACETAMINOPHEN 1000 MG: 500 TABLET ORAL at 02:07

## 2025-07-09 RX ADMIN — OXYCODONE HYDROCHLORIDE 10 MG: 10 TABLET ORAL at 04:06

## 2025-07-09 RX ADMIN — ENOXAPARIN SODIUM 100 MG: 100 INJECTION SUBCUTANEOUS at 04:06

## 2025-07-09 RX ADMIN — ERGOCALCIFEROL 50000 UNITS: 1.25 CAPSULE ORAL at 09:03

## 2025-07-09 RX ADMIN — LAMOTRIGINE 100 MG: 100 TABLET ORAL at 04:06

## 2025-07-09 ASSESSMENT — FIBROSIS 4 INDEX: FIB4 SCORE: 0.55

## 2025-07-09 ASSESSMENT — COGNITIVE AND FUNCTIONAL STATUS - GENERAL
SUGGESTED CMS G CODE MODIFIER MOBILITY: CK
HELP NEEDED FOR BATHING: A LITTLE
WALKING IN HOSPITAL ROOM: A LITTLE
MOBILITY SCORE: 18
DAILY ACTIVITIY SCORE: 22
SUGGESTED CMS G CODE MODIFIER DAILY ACTIVITY: CJ
CLIMB 3 TO 5 STEPS WITH RAILING: A LITTLE
MOVING TO AND FROM BED TO CHAIR: A LITTLE
STANDING UP FROM CHAIR USING ARMS: A LITTLE
DRESSING REGULAR LOWER BODY CLOTHING: A LITTLE
MOVING FROM LYING ON BACK TO SITTING ON SIDE OF FLAT BED: A LITTLE
TURNING FROM BACK TO SIDE WHILE IN FLAT BAD: A LITTLE

## 2025-07-09 ASSESSMENT — PAIN DESCRIPTION - PAIN TYPE
TYPE: ACUTE PAIN
TYPE: ACUTE PAIN

## 2025-07-09 ASSESSMENT — GAIT ASSESSMENTS
DISTANCE (FEET): 75
DEVIATION: BRADYKINETIC;DECREASED BASE OF SUPPORT;OTHER (COMMENT)
GAIT LEVEL OF ASSIST: CONTACT GUARD ASSIST
ASSISTIVE DEVICE: FRONT WHEEL WALKER

## 2025-07-09 ASSESSMENT — ACTIVITIES OF DAILY LIVING (ADL): TOILETING: INDEPENDENT

## 2025-07-09 NOTE — PROGRESS NOTES
Discharge orders received.  Patient arrived to the discharge lounge.  PIV removed by floor RN. Meds to beds medications verified by discharge RN, tamper evident seal in place on oxycodone bottle, bag of medications given to patient.  Instructions given, medications reviewed and general discharge education provided to patient.  Follow up appointments discussed.  Patient verbalized understanding of dc instructions and prescriptions.  Patient signed discharge instructions.  Patient verbalized he had all belongings with him, Denied having any home medications locked in our inpatient pharmacy that  he needs back. Patient wheeled by family member from discharge lounge to private vehicle. Patient left via car with family to home in stable condition.

## 2025-07-09 NOTE — PROGRESS NOTES
Monitor Summary  Rhythm: SR  Rate: 61-77  Ectopy: PAC  Measurements: 0.23/0.10/0.48  ---12 hr Chart Review---

## 2025-07-09 NOTE — DISCHARGE SUMMARY
Discharge Summary    CHIEF COMPLAINT ON ADMISSION  Chief Complaint   Patient presents with    Shortness of Breath     X1 week on and off    Chest Pain     X1 week on and off, worsening when taking a deep breath or coughing     Other     Pt reporting blood in urine and losing control of bowel and bladder x2 weeks.        Reason for Admission  Shortness of Breath; Chest Pain     Admission Date  7/6/2025    CODE STATUS  Full Code    HPI & HOSPITAL COURSE  This is Abelino Crocker, Patient is a 30-year-old male with past medical history of seizure, anxiety, presents with 1 week history of shortness of breath and pleuritic chest pain, also with 6-week history of lower extremity weakness with bowel and bladder incontinence.     Patient recently seen at [facility] emergency department on 5/17/2025 for right foot pain after fall.  Right foot x-ray at that time without acute abnormality.     Patient states that after their fall and ankle sprain on 5/17/2025 they have been relatively immobile.  Patient states that patient states that over the past week they have been having shortness of breath with pleuritic chest pain.  States that approximately 6 weeks ago they started having lower extremity weakness with intermittent bowel and bladder incontinence.  Denies saddle anesthesia.     In the ED, BP 90s to 130s/50s to 70s, HR 90s to 110s, RR 16-20, afebrile, saturating well on room air.  Received cyclobenzaprine 10 mg p.o. x 1, albuterol inhaler x 1, Tylenol 1 g p.o. x 1, lidocaine patch x 1, enoxaparin 100 mg IV x 1.  WBC 7.6, hemoglobin 10.9, , sodium 140, potassium 3.8, bicarb 22, anion gap 14, BUN 5, creatinine 0.76, AST 54, ALT 59, alk phos 86, T. bili 0.4, troponin 9, NT proBNP 170, D-dimer 6.56.  UA unremarkable.  Right ankle x-ray without acute abnormality.  Right foot x-ray without acute abnormality.  Chest x-ray with hazy right lower lobe infiltrates.  CT PE with RML and RLL PE, left lower lobe subsegmental PE,  no radiographic sign of right heart strain, right middle lobe consolidation appearance favoring infarct, small layering right pleural effusion, 6.0 mm RLL pulm nodule.  EKG sinus tachycardia , QTc 474 with S1Q3T3, without ST changes.    He also found to have bilateral DVTs and bilateral PE.  He has been more sedentary due to his lower extremity weakness.  I had extensive discussions with patient and his family about anticoagulation, the risk and benefits including intracranial hemorrhage, epistaxis and GI bleeds.  They understand the risks and are excepting the benefits to continue on anticoagulation.  We confirmed did not need home oxygen at this time. We have placed referrals for patient for anticoagulation clinic.  I confirmed with [facility] patient's Xarelto is $0 co-pay.    He is following with a neurologist, he will have a EMG sometime this next week.  We did treat him for vitamin B12 deficiency which could complicate his numbness/weakness symptoms.  He was also significantly vitamin D deficient, I started patient on ergocalciferol.      Therefore, he is discharged in fair and stable condition to home with organized home healthcare and close outpatient follow-up. Rosamaria FINCH.    The patient met 2-midnight criteria for an inpatient stay at the time of discharge.    Discharge Date  07/09/2025    FOLLOW UP ITEMS POST DISCHARGE  With PCP  With neurologist  With anticoagulation clinic    DISCHARGE DIAGNOSES  Principal Problem:    Bilateral pulmonary embolism (HCC) (POA: Yes)  Active Problems:    Weakness of both lower extremities (POA: Yes)    DVT (deep venous thrombosis) (HCC) (POA: Yes)    Seizure disorder (HCC) (POA: Yes)    Mild intermittent asthma without complication (POA: Yes)    Generalized anxiety disorder with panic attacks (POA: Yes)    Pulmonary infarct (HCC) (POA: Yes)    Elevated liver enzymes (POA: Yes)    Normocytic anemia (POA: Yes)    Pulmonary nodule (POA: Yes)    B12 deficiency (POA:  Yes)    Vitamin D deficiency (POA: Yes)  Resolved Problems:    * No resolved hospital problems. *      FOLLOW UP  No future appointments.  Devika Moreno M.D.  [address]  [facility] [address]  [phone]    Schedule an appointment as soon as possible for a visit  please follow up for a post hospital visit  - please follow up on vitamin deficiency with B12 and Vit D 25.   - please follow up on new bilateral leg DVTs and bilateral pulmonary embolism.  - please repeat labs, including CMP. Patient has elevated LFTs could indicate fatty liver/MALFD.    Neurologist    Go to  Please continue with your next appointment, you will need the EMG to evaluate for your legs if this is a muscular versus nerve condition.  - Please obtain your labs and hospital records from 2014 when you had a lumbar puncture.    [address]  [facility] [address]  [phone]          MEDICATIONS ON DISCHARGE     Medication List        START taking these medications        Instructions   albuterol 108 (90 Base) MCG/ACT Aers inhalation aerosol   Inhale 2 Puffs every 6 hours as needed for Shortness of Breath. Stop if your heart rate is going above 100 beats per minute.  Dose: 2 Puff     cyanocobalamin 1000 MCG Tabs  Start taking on: July 10, 2025  Commonly known as: Vitamin B12   Take 1 Tablet by mouth every day for 30 days.  Dose: 1,000 mcg     oxyCODONE immediate-release 5 MG Tabs  Commonly known as: Roxicodone   Take 1 Tablet by mouth every 8 hours as needed for Severe Pain for up to 5 days. Indications: Acute Pain  Dose: 5 mg     * rivaroxaban 15 MG Tabs tablet  Commonly known as: Xarelto   Doctor's comments: Loading dose. Patient will need a price check.  Take 1 Tablet by mouth 2 times a day for 21 days.  Dose: 15 mg     * rivaroxaban 20 MG Tabs tablet  Start taking on: August 1, 2025  Commonly known as: Xarelto   Doctor's comments: Maintenance dose. To start after loading dose finishes. Patient  will need a price check.  Take 1 Tablet by mouth with dinner for 30 days.  Dose: 20 mg     thiamine 100 MG tablet  Commonly known as: Thiamine   Take 1 Tablet by mouth every day for 30 days.  Dose: 100 mg     vitamin D2 1.25 MG (15676 UT) Caps capsule  Start taking on: July 16, 2025  Commonly known as: Ergocalciferol   Take 1 Capsule by mouth every 7 days for 7 doses.  Dose: 50,000 Units           * This list has 2 medication(s) that are the same as other medications prescribed for you. Read the directions carefully, and ask your doctor or other care provider to review them with you.                CONTINUE taking these medications        Instructions   lamoTRIgine 100 MG Tabs  Commonly known as: LaMICtal   Take 1 Tablet by mouth every day.  Dose: 100 mg            STOP taking these medications      ibuprofen 200 MG Tabs  Commonly known as: Motrin              Allergies  Allergies[1]    DIET  Orders Placed This Encounter   Procedures    Diet Order Diet: Regular     Standing Status:   Standing     Number of Occurrences:   1     Diet::   Regular [1]       ACTIVITY  As tolerated.  Weight bearing as tolerated    CONSULTATIONS  None    PROCEDURES  none    LABORATORY  Lab Results   Component Value Date    SODIUM 147 (H) 07/09/2025    POTASSIUM 4.2 07/09/2025    CHLORIDE 111 07/09/2025    CO2 23 07/09/2025    GLUCOSE 115 (H) 07/09/2025    BUN 15 07/09/2025    CREATININE 0.84 07/09/2025        Lab Results   Component Value Date    WBC 11.6 (H) 07/09/2025    HEMOGLOBIN 10.3 (L) 07/09/2025    HEMATOCRIT 33.0 (L) 07/09/2025    PLATELETCT 454 (H) 07/09/2025        EC-ECHOCARDIOGRAM COMPLETE W/O CONT   Final Result      MR-LUMBAR SPINE-WITH & W/O   Final Result      1.  Mild lumbar spondylosis.   2.  No significant spinal stenosis or nerve compression.   3.  No abnormal enhancement.            MR-THORACIC SPINE-WITH & W/O   Final Result      1.  Multiple chronic mild superior endplate compression and Schmorl's node deformities.    2.  No significant disc herniation, spinal or foraminal stenosis.   3.  No abnormal thoracic cord signal or enhancement      US-EXTREMITY VENOUS LOWER BILAT   Final Result      CT-CTA CHEST PULMONARY ARTERY W/ RECONS   Final Result         1.  Right middle lobe and right lower lobe pulmonary emboli.   2.  Left lower lobe subsegmental pulmonary emboli.   3.  No radiographic findings right heart strain.   4.  Right middle lobe consolidation, appearance favoring pulmonary infarct.   5.  Small layering right pleural effusion.   6.  Pulmonary nodule, see nodule follow-up recommendations below.      Fleischner Society pulmonary nodule recommendations:   Low Risk: CT at 6-12 months, then consider CT at 18-24 months      High Risk: CT at 6-12 months, then CT at 18-24 months      Low Risk - Minimal or absent history of smoking and of other known risk factors.      High Risk - History of smoking or of other known risk factors.      Note: These recommendations do not apply to lung cancer screening, patients with immunosuppression, or patients with known primary cancer.      Fleischner Society 2017 Guidelines for Management of Incidentally Detected Pulmonary Nodules in Adults      These findings were discussed with the patient's clinician, Stephane Snyder, on 7/7/2025 4:12 AM.      DX-ANKLE 2- VIEWS RIGHT   Final Result         1.  No radiographic evidence of acute traumatic injury.      DX-FOOT-2- RIGHT   Final Result         1.  No acute traumatic bony injury.      DX-CHEST-PORTABLE (1 VIEW)   Final Result         1.  Hazy right lower lobe infiltrates.            Total time of the discharge process exceeds 37 minutes.         [1] No Known Allergies

## 2025-07-09 NOTE — CARE PLAN
Problem: Pain - Standard  Goal: Alleviation of pain or a reduction in pain to the patient’s comfort goal  Description: Target End Date:  Prior to discharge or change in level of care    Document on Vitals flowsheet    1.  Document pain using the appropriate pain scale per order or unit policy  2.  Educate and implement non-pharmacologic comfort measures (i.e. relaxation, distraction, massage, cold/heat therapy, etc.)  3.  Pain management medications as ordered  4.  Reassess pain after pain med administration per policy  5.  If opiods administered assess patient's response to pain medication is appropriate per POSS sedation scale  6.  Follow pain management plan developed in collaboration with patient and interdisciplinary team (including palliative care or pain specialists if applicable)  Outcome: Progressing     Problem: Knowledge Deficit - Standard  Goal: Patient and family/care givers will demonstrate understanding of plan of care, disease process/condition, diagnostic tests and medications  Description: Target End Date:  1-3 days or as soon as patient condition allows    Document in Patient Education    1.  Patient and family/caregiver oriented to unit, equipment, visitation policy and means for communicating concern  2.  Complete/review Learning Assessment  3.  Assess knowledge level of disease process/condition, treatment plan, diagnostic tests and medications  4.  Explain disease process/condition, treatment plan, diagnostic tests and medications  Outcome: Progressing     Problem: Fall Risk  Goal: Patient will remain free from falls  Description: Target End Date:  Prior to discharge or change in level of care    Document interventions on the [facility] Kev Fall Risk Assessment    1.  Assess for fall risk factors  2.  Implement fall precautions  Outcome: Progressing     Problem: Respiratory  Goal: Patient will achieve/maintain optimum respiratory ventilation and gas exchange  Description: Target End Date:   Prior to discharge or change in level of care    Document on Assessment flowsheet    1.  Assess and monitor rate, rhythm, depth and effort of respiration  2.  Breath sounds assessed qshift and/or as needed  3.  Assess O2 saturation, administer/titrate oxygen as ordered  4.  Position patient for maximum ventilatory efficiency  5.  Turn, cough, and deep breath with splinting to improve effectiveness  6.  Collaborate with RT to administer medication/treatments per order  7.  Encourage use of incentive spirometer and encourage patient to cough after use and utilize splinting techniques if applicable  8.  Airway suctioning  9.  Monitor sputum production for changes in color, consistency and frequency  10. Perform frequent oral hygiene  11. Alternate physical activity with rest periods  Outcome: Progressing   The patient is Stable - Low risk of patient condition declining or worsening    Shift Goals  Clinical Goals: q4 neuros, safety  Patient Goals: rest, comfort  Family Goals: updates    Progress made toward(s) clinical / shift goals:      Patient is not progressing towards the following goals:

## 2025-07-09 NOTE — DISCHARGE PLANNING
Care Transition Team Final Discharge Disposition    Actual Discharge Information  Discharge Disposition: D/T to home under [facility] care in anticipation of covered skilled care (06)    Case Management Discharge Planning    Admission Date: 7/6/2025  GMLOS: 2.4  ALOS: 2    6-Clicks ADL Score: 18  6-Clicks Mobility Score: 18      Anticipated Discharge Dispo: Discharge Disposition: D/T to home under [facility]HA care in anticipation of covered skilled care (06)  Discharge Address: [address] [place] [address]  [place] [id number]  Discharge Contact Phone Number: [phone]    DME Needed: No    Action(s) Taken: Choice obtained    Obtained  choice. Rosamaria  accepted.    Escalations Completed: None    Medically Clear: Yes    Barriers to Discharge: None    Is the patient up for discharge tomorrow: No             no

## 2025-07-09 NOTE — THERAPY
Occupational Therapy   Initial Evaluation     Patient Name:  Abelino Crocker  Age:  30 y.o., Sex:  male  Medical Record #:  [id number]  Today's Date:  7/9/2025     Precautions  Medical: Fall Risk  Swallowing: Swallow Precautions    Assessment    Patient is 30 y.o. male admitted with SOB, chest pain and blood in urine with associated bowel and bladder incontinence, found to have bilateral PE. Pmhx includes seizure, anxiety. Pt presents to OT eval able to demonstrate self-care independence slowly, with heavy reliance on FWW. Supportive mom at bedside able to assist upon DC. Pt is eager to DC home as soon as medically cleared.     Plan    Occupational Therapy Initial Treatment Plan   Duration: Discharge Needs Only    DC Equipment Recommendations: None  Discharge Recommendations: Recommend home health for continued occupational therapy services      Objective       07/09/25 1505   Time In/Time Out   Prior Living Situation   Prior Services Intermittent Physical Support for ADL Per Family   Housing / Facility 2 Story House   Steps Into Home 0   Steps In Home 12   Equipment Owned 4-Wheel Walker;Quad Cane;Crutches   Lives with - Patient's Self Care Capacity Child Less than 18 Years of Age  (9 yo son)   Comments pt's mother at bedside confirms she provides additional support   Prior Level of ADL Function   Self Feeding Independent   Grooming / Hygiene Independent   Bathing Independent   Dressing Independent   Toileting Independent   Prior Level of IADL Function   Medication Management Independent   Laundry Independent   Kitchen Mobility Independent   Finances Independent   Home Management Independent   Shopping Independent   Prior Level Of Mobility Independent Without Device in Community   Precautions   Medical Fall Risk   Swallowing Swallow Precautions   Vitals   O2 Delivery Device None - Room Air   Pain 0 - 10 Group   Therapist Pain Assessment 0;Nurse Notified;Post Activity Pain Same as Prior to Activity   Cognition     Cognition / Consciousness X   Level of Consciousness Alert   Safety Awareness Impaired   Comments flat affect   Strength Upper Body   Upper Body Strength  X   Gross Strength Generalized Weakness, Equal Bilaterally.    Coordination Upper Body   Coordination WDL   Balance Assessment   Sitting Balance (Static) Fair   Sitting Balance (Dynamic) Fair   Standing Balance (Static) Fair -   Standing Balance (Dynamic) Poor +   Weight Shift Sitting Good   Weight Shift Standing Fair   Comments FWW in standing   Bed Mobility    Supine to Sit Supervised   Sit to Supine Supervised   Scooting Supervised   Rolling Supervised   ADL Assessment   Eating Independent   Grooming Supervision;Standing  (handwashing at sink)   Upper Body Dressing Supervision  (zip-up hoodie)   Lower Body Dressing Supervision  (slip on shoes)   Toileting Supervision   How much help from another person does the patient currently need...   Putting on and taking off regular lower body clothing? 3   Bathing (including washing, rinsing, and drying)? 3   Toileting, which includes using a toilet, bedpan, or urinal? 4   Putting on and taking off regular upper body clothing? 4   Taking care of personal grooming such as brushing teeth? 4   Eating meals? 4   6 Clicks Daily Activity Score 22   Functional Mobility   Sit to Stand Supervised   Bed, Chair, Wheelchair Transfer Supervised   Toilet Transfers Supervised   Transfer Method Stand Step   Mobility FWW   Activity Tolerance   Comments functional for self-care and household mobility   Education Group   Education Provided Role of Occupational Therapist;Activities of Daily Living;Transfers;Home Safety;Pathology of bedrest   Role of Occupational Therapist Patient Response Patient;Acceptance;Explanation;Verbal Demonstration;Family   Home Safety Patient Response Patient;Acceptance;Explanation;Verbal Demonstration;Family   Transfers Patient Response Patient;Acceptance;Explanation;Verbal Demonstration;Family   ADL Patient  Response Patient;Acceptance;Explanation;Verbal Demonstration;Family   Pathology of Bedrest Patient Response Patient;Acceptance;Explanation;Verbal Demonstration;Family   Occupational Therapy Initial Treatment Plan    Duration Discharge Needs Only   Problem List   Problem List Decreased Active Daily Living Skills;Decreased Homemaking Skills   Anticipated Discharge Equipment and Recommendations   DC Equipment Recommendations None   Discharge Recommendations Recommend home health for continued occupational therapy services   Interdisciplinary Plan of Care Collaboration   IDT Collaboration with  Nursing;Family / Caregiver   Patient Position at End of Therapy Seated;Edge of Bed;Call Light within Reach;Family / Friend in Room;Phone within Reach;Tray Table within Reach  (RN present)

## 2025-07-09 NOTE — THERAPY
Physical Therapy   Initial Evaluation     Patient Name:  Abelino Crocker  Age:  30 y.o., Sex:  male  Medical Record #:  [id number]  Today's Date: 7/9/2025     Precautions  Medical: Fall Risk  Swallowing: Swallow Precautions    Assessment  Patient is 30 y.o. male admitted 7/6/2025 with 1 week history of shortness of breath and pleuritic chest pain, also with 6-week history of lower extremity weakness with bowel and bladder incontinence.    Currently been managed for B/L PE, DVT, BLE weakness, pulmonary infarct, pulmonary nodule    PMH:  seizure, anxiety, anemia, asthma     H/O fall on 5/17/2025-Resulting in R ankle sprain     Patient seen for PT evaluation. Patient in bed, agreeable for the session. Able to demonstrate functional mobility tasks as detailed below. Currently appears to be below baseline level of functional mobility. Will continue to benefit from PT services to help improve overall functional mobility. Recommend post-acute placement at this time, however patient is refusing, hence recommend HH PT with family support/assistance.     Plan    Physical Therapy Initial Treatment Plan   Treatment Plan : Bed Mobility, Equipment, Family / Caregiver Training, Gait Training, Neuro Re-Education / Balance, Stair Training, Therapeutic Activities, Therapeutic Exercise  Treatment Frequency: 4 Times per Week  Duration: Until Therapy Goals Met    DC Equipment Recommendations: None (Owns 4WW)  Discharge Recommendations: Recommend post-acute placement for additional physical therapy services prior to discharge home (Patient refusing placement, recommend HH PT with family assistance)     Objective     07/09/25 0945   Time In/Time Out   Therapy Start Time 0910   Therapy End Time 0945   Total Therapy Time 35   Initial Contact Note    Initial Contact Note Order Received and Verified, Physical Therapy Evaluation in Progress with Full Report to Follow.   Precautions   Medical Fall Risk   Swallowing Swallow Precautions   Vitals    Pulse 62   Patient BP Position Sitting  (EOB, post activity)   Blood Pressure 126/68   Pulse Oximetry 95 %   O2 Delivery Device None - Room Air   Vitals Comments Performed 6 Minute walk test (Per RN request) to assess SpO2 and O2 needs-patient able to maintain SpO2 above 92 in room air throughout the walk. Patient placed on continuous O2 monitoring at end of session.   Pain   Pain Scales 0 to 10 Scale    Intervention Declines   Pain 0 - 10 Group   Therapist Pain Assessment Prior to Activity;During Activity;Post Activity   Prior Living Situation   Prior Services Intermittent Physical Support for ADL Per Family   Housing / Facility 2 Story House   Steps Into Home 0   Steps In Home 12   Rail Both Rail (Steps into Home)   Equipment Owned 4-Wheel Walker;Quad Cane;Crutches   Lives with - Patient's Self Care Capacity Child Less than 18 Years of Age  (8Y son)   Comments Patient mentioned that he has lot of family support-his girlfriend, parents, aunt/uncle are able to assist him as needed and help take care of his 8Y old son as well.   Prior Level of Functional Mobility   Bed Mobility Independent   Transfer Status Independent   Ambulation Independent   Ambulation Distance Limited Community   Assistive Devices Used Quad Cane   Stairs Independent   Comments Patient mentioned that he mostly uses Quad cane, he does use his 4WW occasionally when he is having a bad day. He does work [employer] as able.   History of Falls   History of Falls Yes   Date of Last Fall   (H/O 1 fall during winter time-slipped on ice)   Cognition    Cognition / Consciousness X   Level of Consciousness Alert   Safety Awareness Impaired   Active ROM Lower Body    Active ROM Lower Body  X   Comments Grossly B/L hip flexion, B/L knee flexion/extension, B/L ankle DF/PF-slightly impaired; R worse than L   Strength Lower Body   Lower Body Strength  X   Comments Grossly LLE 3+/5, RLE 3/5 except B/L hip flexor 3-/5   Sensation Lower Body   Lower Extremity Sensation    X   Comments Reports of numbness in B/L LE-knee down to toes; per patient, when his symptoms began, he had numbness from lower back till toes   Lower Body Muscle Tone   Lower Body Muscle Tone  WDL   Coordination Lower Body    Coordination Lower Body  X   Comments Heel to shin-increased difficulty to complete the task due to inadequate strength; alternate ankle movements-generalized slow speed   Other Treatments   Other Treatments Provided Patient's girlfriend present in the room. Educated patient & his girlfriend on importance of daily & frequent mobility, OOB to chair for meals, ambulate with supervision/assistance from nursing staff, use of FWW for improved balance & stability, activity pacing. Discussed about DC recommendations-explained about short term rehab vs HH PT vs out-patient PT; patient prefers to return home since he has to take care of his 8Y old son. Patient mentioned that he has been managing with LE weakness, has good days & bad days.   Balance Assessment   Sitting Balance (Static) Fair   Sitting Balance (Dynamic) Fair   Standing Balance (Static) Fair -   Standing Balance (Dynamic) Poor +   Weight Shift Sitting Good   Weight Shift Standing Fair   Comments Seated EOB; Standing with FWW   Bed Mobility    Supine to Sit Supervised   Sit to Supine Supervised   Scooting Supervised   Rolling Supervised   Comments HOB flat, without use of rails, towards R side; performed x 2 times; cues for log roll technique; patient able to practice it   Gait Analysis   Gait Level Of Assist Contact Guard Assist   Assistive Device Front Wheel Walker   Distance (Feet) 75   # of Times Distance was Traveled 2  (1 seated rest break, brief standing rest breaks as needed)   Deviation Bradykinetic;Decreased Base Of Support;Other (Comment)  (Reduced asymmetrical step & stride length)   # of Stairs Climbed 3  (B/L rails)   Level of Assist with Stairs Contact Guard Assist   Comments Cues for appropriate base of support, appropriate  use of FWW, safety, balance, pacing; Cues for appropriate foot placement and use of UE for balance   Functional Mobility   Sit to Stand Contact Guard Assist   Bed, Chair, Wheelchair Transfer Refused   Mobility EOB-sit-stand-ambulate to bathroom-ambulate in the room-EOB-sit-stand-ambulate in the hallway-navigate stairs-ambulate back to room-EOB-bed-EOB   Comments W FWW; cues for hand placement, LE placement   6 Clicks Assessment - How much HELP from from another person do you currently need... (If the patient hasn't done an activity recently, how much help from another person do you think he/she would need if he/she tried?)   Turning from your back to your side while in a flat bed without using bedrails? 3   Moving from lying on your back to sitting on the side of a flat bed without using bedrails? 3   Moving to and from a bed to a chair (including a wheelchair)? 3   Standing up from a chair using your arms (e.g., wheelchair, or bedside chair)? 3   Walking in hospital room? 3   Climbing 3-5 steps with a railing? 3   6 clicks Mobility Score 18   Activity Tolerance   Sitting Edge of Bed Pre & Post session   Patient / Family Goals    Patient / Family Goal #1 To return to prior level of functional mobility   Short Term Goals    Short Term Goal # 1 Patient will perform supine-sit, sit-supine with HOB flat without rails with supervision in 6 visits to safely get in & out of bed   Short Term Goal # 2 Patient will perform sit-stand with LRAD with supervision in 6 visits to progress with functional mobility   Short Term Goal # 3 Patient will perform chair transfers with LRAD with supervision in 6 visits to safely get OOB to chair   Short Term Goal # 4 Patient will ambulate 200 feet with LRAD with supervision in 6 visits to safely ambulate household & community distance   Short Term Goal # 5 Patient will negotiate 12 steps with B/L rails with supervision in 6 visits to safely navigate stairs at home   Education Group    Education Provided Role of Physical Therapist   Role of Physical Therapist Patient Response Patient;Significant Other;Acceptance;Explanation;Verbal Demonstration   Physical Therapy Initial Treatment Plan    Treatment Plan  Bed Mobility;Equipment;Family / Caregiver Training;Gait Training;Neuro Re-Education / Balance;Stair Training;Therapeutic Activities;Therapeutic Exercise   Treatment Frequency 4 Times per Week   Duration Until Therapy Goals Met   Problem List    Problems Impaired Bed Mobility;Impaired Transfers;Impaired Ambulation;Functional Strength Deficit;Impaired Balance;Safety Awareness Deficits / Cognition   Anticipated Discharge Equipment and Recommendations   DC Equipment Recommendations None  (Owns 4WW)   Discharge Recommendations Recommend post-acute placement for additional physical therapy services prior to discharge home  (Patient refusing placement, recommend  PT with family assistance)   Interdisciplinary Plan of Care Collaboration   IDT Collaboration with  Nursing;Certified Nursing Assistant   Patient Position at End of Therapy Call Light within Reach;Tray Table within Reach;Seated;Edge of Bed;Bed Alarm On;Family / Friend in Room   Session Information   Date / Session Number  7/9-1(1/4, 7/15)   Priority   (DC-Home, 12steps)

## 2025-07-09 NOTE — DISCHARGE PLANNING
Received Choice form at [phone]  Agency/Facility Name: [facility]  Referral sent per Choice form @ [phone]

## 2025-07-10 ENCOUNTER — TELEPHONE (OUTPATIENT)
Dept: VASCULAR LAB | Facility: MEDICAL CENTER | Age: 30
End: 2025-07-10
Payer: COMMERCIAL

## 2025-07-10 LAB
LEAD BLDV-MCNC: <2 UG/DL
MUSK AB SER QL: NORMAL

## 2025-07-10 NOTE — TELEPHONE ENCOUNTER
[facility]     Received anticoagulation referral from Dr. Buitrago on 7/10/25.     Called patient to schedule an appt to establish care. No answer. LVM.    1st attempt     Insurance: [place] [employer]  PCP: [facility]  Locations to be seen: Any    If no response by 8/10/25 OR 2 no shows/cancellations, will remove from referral list    Manuel Lezama, PharmD  [facility] Anticoagulation/Pharmacotherapy Clinic  Phone [phone]

## 2025-07-11 LAB
ACHR BIND AB SER-SCNC: 0 NMOL/L (ref 0–0.4)
ACHR BLOCK AB/ACHR TOTAL SFR SER: 1 % (ref 0–26)
DSDNA AB TITR SER CLIF: NORMAL {TITER}

## 2025-07-11 NOTE — Clinical Note
REFERRAL APPROVAL NOTICE         Sent on July 11, 2025                   Abelino Crocker  [address]  [place] [address]                   Dear Mr. Crocker,    After a careful review of the medical information and benefit coverage, [facility] has processed your referral. See below for additional details.    If applicable, you must be actively enrolled with your insurance for coverage of the authorized service. If you have any questions regarding your coverage, please contact your insurance directly.    REFERRAL INFORMATION   Referral #:  [id number]  Referred-To Department    Referred-By Provider:  Pulmonary and Sleep Medicine    Tan Buitrago M.D.   Pulmonary/sleep [facility]      [address]   [place] [address]  [phone] [address]  [place] [address]  [phone]    Referral Start Date:  07/08/2025  Referral End Date:   07/08/2026           SCHEDULING  If you do not already have an appointment, please call [phone] to make an appointment.   MORE INFORMATION  As a reminder, [facility] ownership has changed, meaning this location is now owned and operated by [facility]. As such, we want to clarify that our patients should expect to receive two separate bills for the services received at [facility] - one representing the [facility] facility fees as the owner of the establishment, and the other to represent the physician's services and subsequent fees. You can speak with your insurance carrier for a pricing estimate by calling the customer service number on the back of your card and ask about charges for a hospital outpatient visit.  If you do not already have a [employer] account, sign up at: [email or web address]  You can access your medical information, make appointments, see lab results, billing  information, and more.  If you have questions regarding this referral, please contact  the [facility] department at:             [phone]. Monday - Friday 7:30AM - 5:00PM.      Sincerely,  [facility]

## 2025-07-12 LAB
CENTROMERE IGG TITR SER IF: 0 AU/ML (ref 0–40)
ENA JO1 AB TITR SER: 1 AU/ML (ref 0–40)
ENA SCL70 IGG SER QL: 1 AU/ML (ref 0–40)
ENA SM IGG SER-ACNC: 1 AU/ML (ref 0–40)
ENA SS-A 60KD AB SER-ACNC: 1 AU/ML (ref 0–40)
ENA SS-A IGG SER QL: 2 AU/ML (ref 0–40)
ENA SS-B IGG SER IA-ACNC: 0 AU/ML (ref 0–40)
RIBOSOMAL P AB SER-ACNC: 1 AU/ML (ref 0–40)
U1 SNRNP IGG SER QL: 2 UNITS (ref 0–19)
VIT B1 BLD-MCNC: 115 NMOL/L (ref 70–180)

## 2025-07-13 LAB — VIT B6 SERPL-MCNC: 18.8 NMOL/L (ref 20–125)

## 2025-07-17 ENCOUNTER — TELEPHONE (OUTPATIENT)
Dept: VASCULAR LAB | Facility: MEDICAL CENTER | Age: 30
End: 2025-07-17
Payer: COMMERCIAL

## 2025-07-17 NOTE — TELEPHONE ENCOUNTER
[facility]     Received anticoagulation referral from Dr. Buitrago on 7/10/25.     Called patient to schedule an appt to establish care. No answer. LVM.  MCM sent with link     2nd attempt     Insurance: [place] [employer]  PCP: [facility]  Locations to be seen: Any    If no response by 8/10/25 OR 2 no shows/cancellations, will remove from referral list    Manuel Lezama, PharmD  [facility] Anticoagulation/Pharmacotherapy Clinic  Phone [phone]

## 2025-07-24 ENCOUNTER — TELEPHONE (OUTPATIENT)
Dept: VASCULAR LAB | Facility: MEDICAL CENTER | Age: 30
End: 2025-07-24
Payer: COMMERCIAL

## 2025-07-24 NOTE — TELEPHONE ENCOUNTER
[facility] Heart and Vascular Health and Pharmacotherapy Programs     Received anticoagulation referral from Dr. Buitrago on 7/10/25.     Called patient to schedule an appt to establish care. Initial appointment scheduled for Friday 7/25/25 at 7:45 am  MCM sent with link on 7/17/25     3rd attempt     Insurance: [place] [employer]  PCP: [facility]  Locations to be seen: Any     If no response by 8/10/25 OR 2 no shows/cancellations, will remove from referral list     Manuel Lezama, PharmD  [facility] Anticoagulation/Pharmacotherapy Clinic  Phone [phone]

## 2025-07-25 ENCOUNTER — APPOINTMENT (OUTPATIENT)
Dept: VASCULAR LAB | Facility: MEDICAL CENTER | Age: 30
End: 2025-07-25
Attending: INTERNAL MEDICINE
Payer: COMMERCIAL

## 2025-07-29 ENCOUNTER — TELEPHONE (OUTPATIENT)
Dept: VASCULAR LAB | Facility: MEDICAL CENTER | Age: 30
End: 2025-07-29
Payer: COMMERCIAL

## 2025-07-29 NOTE — TELEPHONE ENCOUNTER
[facility]     Received anticoagulation referral from Dr. Buitrago on 7/10/25.     Called and LVM for patient to schedule an appt to establish care.  Of note, pt missed appt on 07/25/25.     4th attempt     Insurance: [place] [employer]  PCP: [facility]  Locations to be seen: [facility]     If no response by 8/10/25 OR 2 no shows/cancellations, will remove from referral list     Will Cobb, PharmD, BCACP  [facility] Anticoagulation/Pharmacotherapy Clinic  Phone [phone]

## 2025-08-07 ENCOUNTER — TELEPHONE (OUTPATIENT)
Dept: VASCULAR LAB | Facility: MEDICAL CENTER | Age: 30
End: 2025-08-07
Payer: COMMERCIAL

## 2025-08-11 ENCOUNTER — APPOINTMENT (OUTPATIENT)
Dept: VASCULAR LAB | Facility: MEDICAL CENTER | Age: 30
End: 2025-08-11
Attending: INTERNAL MEDICINE
Payer: COMMERCIAL

## 2025-08-20 ENCOUNTER — TELEPHONE (OUTPATIENT)
Dept: VASCULAR LAB | Facility: MEDICAL CENTER | Age: 30
End: 2025-08-20
Payer: COMMERCIAL

## 2025-08-25 ENCOUNTER — DOCUMENTATION (OUTPATIENT)
Dept: VASCULAR LAB | Facility: MEDICAL CENTER | Age: 30
End: 2025-08-25
Payer: COMMERCIAL

## 2025-09-03 ENCOUNTER — APPOINTMENT (OUTPATIENT)
Dept: MEDICAL GROUP | Facility: MEDICAL CENTER | Age: 30
End: 2025-09-03
Payer: COMMERCIAL